# Patient Record
Sex: FEMALE | URBAN - METROPOLITAN AREA
[De-identification: names, ages, dates, MRNs, and addresses within clinical notes are randomized per-mention and may not be internally consistent; named-entity substitution may affect disease eponyms.]

---

## 2023-12-19 ENCOUNTER — LAB REQUISITION (OUTPATIENT)
Dept: LAB | Facility: CLINIC | Age: 63
End: 2023-12-19
Payer: COMMERCIAL

## 2023-12-19 DIAGNOSIS — M79.673 PAIN IN UNSPECIFIED FOOT: ICD-10-CM

## 2023-12-19 LAB
CRP SERPL-MCNC: 28.1 MG/L
ERYTHROCYTE [DISTWIDTH] IN BLOOD BY AUTOMATED COUNT: 13.3 % (ref 10–15)
ERYTHROCYTE [SEDIMENTATION RATE] IN BLOOD BY WESTERGREN METHOD: 18 MM/HR (ref 0–30)
HCT VFR BLD AUTO: 38.1 % (ref 35–47)
HGB BLD-MCNC: 12.9 G/DL (ref 11.7–15.7)
MCH RBC QN AUTO: 30.4 PG (ref 26.5–33)
MCHC RBC AUTO-ENTMCNC: 33.9 G/DL (ref 31.5–36.5)
MCV RBC AUTO: 90 FL (ref 78–100)
PLATELET # BLD AUTO: 289 10E3/UL (ref 150–450)
RBC # BLD AUTO: 4.25 10E6/UL (ref 3.8–5.2)
URATE SERPL-MCNC: 10.8 MG/DL (ref 2.4–5.7)
WBC # BLD AUTO: 10.3 10E3/UL (ref 4–11)

## 2023-12-19 PROCEDURE — 85027 COMPLETE CBC AUTOMATED: CPT | Mod: ORL | Performed by: PHYSICIAN ASSISTANT

## 2023-12-19 PROCEDURE — 36415 COLL VENOUS BLD VENIPUNCTURE: CPT | Mod: ORL | Performed by: PHYSICIAN ASSISTANT

## 2023-12-19 PROCEDURE — 86140 C-REACTIVE PROTEIN: CPT | Mod: ORL | Performed by: PHYSICIAN ASSISTANT

## 2023-12-19 PROCEDURE — 84550 ASSAY OF BLOOD/URIC ACID: CPT | Mod: ORL | Performed by: PHYSICIAN ASSISTANT

## 2023-12-19 PROCEDURE — 85652 RBC SED RATE AUTOMATED: CPT | Mod: ORL | Performed by: PHYSICIAN ASSISTANT

## 2025-05-14 ENCOUNTER — HOSPITAL ENCOUNTER (EMERGENCY)
Facility: CLINIC | Age: 65
Discharge: ANOTHER HOSPITAL | End: 2025-05-15
Attending: FAMILY MEDICINE | Admitting: FAMILY MEDICINE
Payer: COMMERCIAL

## 2025-05-14 DIAGNOSIS — R79.89 ELEVATED SERUM CREATININE: ICD-10-CM

## 2025-05-14 DIAGNOSIS — I44.2 THIRD DEGREE HEART BLOCK (H): ICD-10-CM

## 2025-05-14 DIAGNOSIS — I45.9 STOKES-ADAMS SYNCOPE: Primary | ICD-10-CM

## 2025-05-14 DIAGNOSIS — R79.89 ELEVATED TROPONIN: ICD-10-CM

## 2025-05-14 LAB
ANION GAP SERPL CALCULATED.3IONS-SCNC: 16 MMOL/L (ref 7–15)
ATRIAL RATE - MUSE: 34 BPM
BASE EXCESS BLDV CALC-SCNC: -6.1 MMOL/L (ref -3–3)
BASOPHILS # BLD AUTO: 0 10E3/UL (ref 0–0.2)
BASOPHILS NFR BLD AUTO: 0 %
BUN SERPL-MCNC: 43.2 MG/DL (ref 8–23)
CALCIUM SERPL-MCNC: 9.4 MG/DL (ref 8.8–10.4)
CHLORIDE SERPL-SCNC: 112 MMOL/L (ref 98–107)
CREAT SERPL-MCNC: 2.37 MG/DL (ref 0.51–0.95)
DIASTOLIC BLOOD PRESSURE - MUSE: 78 MMHG
EGFRCR SERPLBLD CKD-EPI 2021: 23 ML/MIN/1.73M2
EOSINOPHIL # BLD AUTO: 0.1 10E3/UL (ref 0–0.7)
EOSINOPHIL NFR BLD AUTO: 1 %
ERYTHROCYTE [DISTWIDTH] IN BLOOD BY AUTOMATED COUNT: 16 % (ref 10–15)
GLUCOSE SERPL-MCNC: 143 MG/DL (ref 70–99)
HCO3 BLDV-SCNC: 18 MMOL/L (ref 21–28)
HCO3 SERPL-SCNC: 13 MMOL/L (ref 22–29)
HCT VFR BLD AUTO: 35.6 % (ref 35–47)
HGB BLD-MCNC: 11.7 G/DL (ref 11.7–15.7)
HOLD SPECIMEN: NORMAL
IMM GRANULOCYTES # BLD: 0.1 10E3/UL
IMM GRANULOCYTES NFR BLD: 1 %
INTERPRETATION ECG - MUSE: NORMAL
LACTATE SERPL-SCNC: 1.8 MMOL/L (ref 0.7–2)
LYMPHOCYTES # BLD AUTO: 2.1 10E3/UL (ref 0.8–5.3)
LYMPHOCYTES NFR BLD AUTO: 16 %
MAGNESIUM SERPL-MCNC: 2.2 MG/DL (ref 1.7–2.3)
MCH RBC QN AUTO: 31.1 PG (ref 26.5–33)
MCHC RBC AUTO-ENTMCNC: 32.9 G/DL (ref 31.5–36.5)
MCV RBC AUTO: 95 FL (ref 78–100)
MONOCYTES # BLD AUTO: 1 10E3/UL (ref 0–1.3)
MONOCYTES NFR BLD AUTO: 8 %
NEUTROPHILS # BLD AUTO: 9.7 10E3/UL (ref 1.6–8.3)
NEUTROPHILS NFR BLD AUTO: 74 %
NRBC # BLD AUTO: 0 10E3/UL
NRBC BLD AUTO-RTO: 0 /100
NT-PROBNP SERPL-MCNC: 4993 PG/ML (ref 0–226)
O2/TOTAL GAS SETTING VFR VENT: 21 %
OXYHGB MFR BLDV: 92 % (ref 70–75)
P AXIS - MUSE: 27 DEGREES
PCO2 BLDV: 29 MM HG (ref 40–50)
PH BLDV: 7.39 [PH] (ref 7.32–7.43)
PLATELET # BLD AUTO: 271 10E3/UL (ref 150–450)
PO2 BLDV: 64 MM HG (ref 25–47)
POTASSIUM SERPL-SCNC: 5.2 MMOL/L (ref 3.4–5.3)
PR INTERVAL - MUSE: NORMAL MS
QRS DURATION - MUSE: 120 MS
QT - MUSE: 616 MS
QTC - MUSE: 449 MS
R AXIS - MUSE: -34 DEGREES
RBC # BLD AUTO: 3.76 10E6/UL (ref 3.8–5.2)
SAO2 % BLDV: 93.6 % (ref 70–75)
SODIUM SERPL-SCNC: 141 MMOL/L (ref 135–145)
SYSTOLIC BLOOD PRESSURE - MUSE: 188 MMHG
T AXIS - MUSE: 100 DEGREES
T4 FREE SERPL-MCNC: 0.97 NG/DL (ref 0.9–1.7)
TROPONIN T SERPL HS-MCNC: 130 NG/L
TROPONIN T SERPL HS-MCNC: 247 NG/L
TSH SERPL DL<=0.005 MIU/L-ACNC: 7.67 UIU/ML (ref 0.3–4.2)
VALPROATE SERPL-MCNC: 55.9 UG/ML (ref 50–100)
VENTRICULAR RATE- MUSE: 32 BPM
WBC # BLD AUTO: 13.1 10E3/UL (ref 4–11)

## 2025-05-14 PROCEDURE — 99285 EMERGENCY DEPT VISIT HI MDM: CPT | Mod: 25

## 2025-05-14 PROCEDURE — 85025 COMPLETE CBC W/AUTO DIFF WBC: CPT | Performed by: FAMILY MEDICINE

## 2025-05-14 PROCEDURE — 96374 THER/PROPH/DIAG INJ IV PUSH: CPT

## 2025-05-14 PROCEDURE — 83735 ASSAY OF MAGNESIUM: CPT | Performed by: FAMILY MEDICINE

## 2025-05-14 PROCEDURE — 93005 ELECTROCARDIOGRAM TRACING: CPT | Performed by: EMERGENCY MEDICINE

## 2025-05-14 PROCEDURE — 36415 COLL VENOUS BLD VENIPUNCTURE: CPT | Performed by: FAMILY MEDICINE

## 2025-05-14 PROCEDURE — 83880 ASSAY OF NATRIURETIC PEPTIDE: CPT | Performed by: FAMILY MEDICINE

## 2025-05-14 PROCEDURE — 84484 ASSAY OF TROPONIN QUANT: CPT | Performed by: FAMILY MEDICINE

## 2025-05-14 PROCEDURE — 84443 ASSAY THYROID STIM HORMONE: CPT | Performed by: FAMILY MEDICINE

## 2025-05-14 PROCEDURE — 250N000011 HC RX IP 250 OP 636: Performed by: FAMILY MEDICINE

## 2025-05-14 PROCEDURE — 82805 BLOOD GASES W/O2 SATURATION: CPT | Performed by: FAMILY MEDICINE

## 2025-05-14 PROCEDURE — 83605 ASSAY OF LACTIC ACID: CPT | Performed by: FAMILY MEDICINE

## 2025-05-14 PROCEDURE — 80164 ASSAY DIPROPYLACETIC ACD TOT: CPT | Performed by: FAMILY MEDICINE

## 2025-05-14 PROCEDURE — 82565 ASSAY OF CREATININE: CPT | Performed by: FAMILY MEDICINE

## 2025-05-14 PROCEDURE — 36415 COLL VENOUS BLD VENIPUNCTURE: CPT | Performed by: EMERGENCY MEDICINE

## 2025-05-14 PROCEDURE — 84439 ASSAY OF FREE THYROXINE: CPT | Performed by: FAMILY MEDICINE

## 2025-05-14 PROCEDURE — 258N000003 HC RX IP 258 OP 636: Performed by: FAMILY MEDICINE

## 2025-05-14 RX ORDER — LATANOPROST 50 UG/ML
1 SOLUTION/ DROPS OPHTHALMIC AT BEDTIME
Status: ON HOLD | COMMUNITY

## 2025-05-14 RX ORDER — DIVALPROEX SODIUM 500 MG/1
1000 TABLET, FILM COATED, EXTENDED RELEASE ORAL AT BEDTIME
Status: ON HOLD | COMMUNITY

## 2025-05-14 RX ORDER — LISINOPRIL 20 MG/1
20 TABLET ORAL DAILY
Status: ON HOLD | COMMUNITY

## 2025-05-14 RX ORDER — FAMOTIDINE 40 MG/1
40 TABLET, FILM COATED ORAL DAILY PRN
Status: ON HOLD | COMMUNITY

## 2025-05-14 RX ORDER — DIVALPROEX SODIUM 500 MG/1
1500 TABLET, FILM COATED, EXTENDED RELEASE ORAL AT BEDTIME
Status: ON HOLD | COMMUNITY

## 2025-05-14 RX ORDER — ALLOPURINOL 300 MG/1
1 TABLET ORAL
Status: ON HOLD | COMMUNITY

## 2025-05-14 RX ORDER — DIPHENHYDRAMINE HCL 25 MG
50 TABLET ORAL AT BEDTIME
Status: ON HOLD | COMMUNITY

## 2025-05-14 RX ORDER — ATROPINE SULFATE 0.1 MG/ML
1 INJECTION INTRAVENOUS ONCE
Status: COMPLETED | OUTPATIENT
Start: 2025-05-14 | End: 2025-05-14

## 2025-05-14 RX ORDER — OLANZAPINE 20 MG/1
20 TABLET, FILM COATED ORAL AT BEDTIME
Status: ON HOLD | COMMUNITY

## 2025-05-14 RX ORDER — QUETIAPINE FUMARATE 100 MG/1
100-200 TABLET, FILM COATED ORAL AT BEDTIME
Status: ON HOLD | COMMUNITY

## 2025-05-14 RX ADMIN — SODIUM CHLORIDE 500 ML: 0.9 INJECTION, SOLUTION INTRAVENOUS at 21:19

## 2025-05-14 RX ADMIN — ATROPINE SULFATE 1 MG: 0.1 INJECTION INTRAVENOUS at 22:57

## 2025-05-14 ASSESSMENT — COLUMBIA-SUICIDE SEVERITY RATING SCALE - C-SSRS
2. HAVE YOU ACTUALLY HAD ANY THOUGHTS OF KILLING YOURSELF IN THE PAST MONTH?: NO
6. HAVE YOU EVER DONE ANYTHING, STARTED TO DO ANYTHING, OR PREPARED TO DO ANYTHING TO END YOUR LIFE?: NO
1. IN THE PAST MONTH, HAVE YOU WISHED YOU WERE DEAD OR WISHED YOU COULD GO TO SLEEP AND NOT WAKE UP?: NO

## 2025-05-14 ASSESSMENT — ACTIVITIES OF DAILY LIVING (ADL)
ADLS_ACUITY_SCORE: 42

## 2025-05-15 ENCOUNTER — APPOINTMENT (OUTPATIENT)
Dept: CARDIOLOGY | Facility: CLINIC | Age: 65
End: 2025-05-15
Attending: HOSPITALIST
Payer: COMMERCIAL

## 2025-05-15 ENCOUNTER — HOSPITAL ENCOUNTER (INPATIENT)
Facility: CLINIC | Age: 65
End: 2025-05-15
Attending: HOSPITALIST | Admitting: HOSPITALIST
Payer: COMMERCIAL

## 2025-05-15 VITALS
TEMPERATURE: 99.2 F | BODY MASS INDEX: 38.2 KG/M2 | OXYGEN SATURATION: 94 % | HEIGHT: 65 IN | WEIGHT: 229.28 LBS | DIASTOLIC BLOOD PRESSURE: 79 MMHG | RESPIRATION RATE: 20 BRPM | HEART RATE: 90 BPM | SYSTOLIC BLOOD PRESSURE: 153 MMHG

## 2025-05-15 VITALS
HEART RATE: 30 BPM | TEMPERATURE: 98.2 F | OXYGEN SATURATION: 95 % | SYSTOLIC BLOOD PRESSURE: 169 MMHG | RESPIRATION RATE: 30 BRPM | DIASTOLIC BLOOD PRESSURE: 72 MMHG | WEIGHT: 220 LBS | HEIGHT: 65 IN | BODY MASS INDEX: 36.65 KG/M2

## 2025-05-15 DIAGNOSIS — I45.9 STOKES-ADAMS SYNCOPE: ICD-10-CM

## 2025-05-15 DIAGNOSIS — R52 PAIN: ICD-10-CM

## 2025-05-15 DIAGNOSIS — R09.81 CONGESTION OF PARANASAL SINUS: ICD-10-CM

## 2025-05-15 DIAGNOSIS — I10 BENIGN ESSENTIAL HYPERTENSION: ICD-10-CM

## 2025-05-15 DIAGNOSIS — I44.2 COMPLETE HEART BLOCK (H): Primary | ICD-10-CM

## 2025-05-15 DIAGNOSIS — I44.2 THIRD DEGREE HEART BLOCK (H): ICD-10-CM

## 2025-05-15 LAB
ALBUMIN UR-MCNC: NEGATIVE MG/DL
ANION GAP SERPL CALCULATED.3IONS-SCNC: 10 MMOL/L (ref 7–15)
APPEARANCE UR: CLEAR
ATRIAL RATE - MUSE: 93 BPM
BASOPHILS # BLD AUTO: 0 10E3/UL (ref 0–0.2)
BASOPHILS NFR BLD AUTO: 0 %
BILIRUB UR QL STRIP: NEGATIVE
BUN SERPL-MCNC: 37.7 MG/DL (ref 8–23)
CALCIUM SERPL-MCNC: 9.4 MG/DL (ref 8.8–10.4)
CHLORIDE SERPL-SCNC: 113 MMOL/L (ref 98–107)
COLOR UR AUTO: NORMAL
CREAT SERPL-MCNC: 2.13 MG/DL (ref 0.51–0.95)
DIASTOLIC BLOOD PRESSURE - MUSE: NORMAL MMHG
EGFRCR SERPLBLD CKD-EPI 2021: 26 ML/MIN/1.73M2
EOSINOPHIL # BLD AUTO: 0.1 10E3/UL (ref 0–0.7)
EOSINOPHIL NFR BLD AUTO: 1 %
ERYTHROCYTE [DISTWIDTH] IN BLOOD BY AUTOMATED COUNT: 16 % (ref 10–15)
ERYTHROCYTE [DISTWIDTH] IN BLOOD BY AUTOMATED COUNT: 16.2 % (ref 10–15)
GLUCOSE BLDC GLUCOMTR-MCNC: 121 MG/DL (ref 70–99)
GLUCOSE SERPL-MCNC: 116 MG/DL (ref 70–99)
GLUCOSE UR STRIP-MCNC: NEGATIVE MG/DL
HCO3 SERPL-SCNC: 20 MMOL/L (ref 22–29)
HCT VFR BLD AUTO: 33.6 % (ref 35–47)
HCT VFR BLD AUTO: 34.5 % (ref 35–47)
HGB BLD-MCNC: 11.1 G/DL (ref 11.7–15.7)
HGB BLD-MCNC: 11.3 G/DL (ref 11.7–15.7)
HGB UR QL STRIP: NEGATIVE
IMM GRANULOCYTES # BLD: 0.1 10E3/UL
IMM GRANULOCYTES NFR BLD: 0 %
INR PPP: 1.19 (ref 0.85–1.15)
INTERPRETATION ECG - MUSE: NORMAL
KETONES UR STRIP-MCNC: NEGATIVE MG/DL
LEUKOCYTE ESTERASE UR QL STRIP: NEGATIVE
LVEF ECHO: NORMAL
LYMPHOCYTES # BLD AUTO: 4.7 10E3/UL (ref 0.8–5.3)
LYMPHOCYTES NFR BLD AUTO: 42 %
MCH RBC QN AUTO: 31.2 PG (ref 26.5–33)
MCH RBC QN AUTO: 31.6 PG (ref 26.5–33)
MCHC RBC AUTO-ENTMCNC: 32.8 G/DL (ref 31.5–36.5)
MCHC RBC AUTO-ENTMCNC: 33 G/DL (ref 31.5–36.5)
MCV RBC AUTO: 95 FL (ref 78–100)
MCV RBC AUTO: 96 FL (ref 78–100)
MONOCYTES # BLD AUTO: 0.7 10E3/UL (ref 0–1.3)
MONOCYTES NFR BLD AUTO: 7 %
NEUTROPHILS # BLD AUTO: 5.6 10E3/UL (ref 1.6–8.3)
NEUTROPHILS NFR BLD AUTO: 50 %
NITRATE UR QL: NEGATIVE
NRBC # BLD AUTO: 0 10E3/UL
NRBC BLD AUTO-RTO: 0 /100
P AXIS - MUSE: 46 DEGREES
PH UR STRIP: 6 [PH] (ref 5–7)
PLATELET # BLD AUTO: 220 10E3/UL (ref 150–450)
PLATELET # BLD AUTO: 224 10E3/UL (ref 150–450)
POTASSIUM SERPL-SCNC: 5.4 MMOL/L (ref 3.4–5.3)
PR INTERVAL - MUSE: 198 MS
PROTHROMBIN TIME: 14.8 SECONDS (ref 11.8–14.8)
QRS DURATION - MUSE: 138 MS
QT - MUSE: 426 MS
QTC - MUSE: 529 MS
R AXIS - MUSE: 110 DEGREES
RBC # BLD AUTO: 3.51 10E6/UL (ref 3.8–5.2)
RBC # BLD AUTO: 3.62 10E6/UL (ref 3.8–5.2)
SODIUM SERPL-SCNC: 143 MMOL/L (ref 135–145)
SP GR UR STRIP: 1.01 (ref 1–1.03)
SYSTOLIC BLOOD PRESSURE - MUSE: NORMAL MMHG
T AXIS - MUSE: -55 DEGREES
TROPONIN T SERPL HS-MCNC: 360 NG/L
TROPONIN T SERPL HS-MCNC: 373 NG/L
UROBILINOGEN UR STRIP-MCNC: NORMAL MG/DL
VENTRICULAR RATE- MUSE: 93 BPM
WBC # BLD AUTO: 10.8 10E3/UL (ref 4–11)
WBC # BLD AUTO: 11.2 10E3/UL (ref 4–11)

## 2025-05-15 PROCEDURE — 99291 CRITICAL CARE FIRST HOUR: CPT | Mod: 25 | Performed by: INTERNAL MEDICINE

## 2025-05-15 PROCEDURE — C1785 PMKR, DUAL, RATE-RESP: HCPCS | Performed by: INTERNAL MEDICINE

## 2025-05-15 PROCEDURE — 999N000208 ECHOCARDIOGRAM COMPLETE

## 2025-05-15 PROCEDURE — 258N000002 HC RX IP 258 OP 250: Performed by: PHYSICIAN ASSISTANT

## 2025-05-15 PROCEDURE — 250N000013 HC RX MED GY IP 250 OP 250 PS 637: Performed by: HOSPITALIST

## 2025-05-15 PROCEDURE — 5A1223Z PERFORMANCE OF CARDIAC PACING, CONTINUOUS: ICD-10-PCS | Performed by: INTERNAL MEDICINE

## 2025-05-15 PROCEDURE — 93005 ELECTROCARDIOGRAM TRACING: CPT

## 2025-05-15 PROCEDURE — 99223 1ST HOSP IP/OBS HIGH 75: CPT | Mod: FS | Performed by: PHYSICIAN ASSISTANT

## 2025-05-15 PROCEDURE — 250N000011 HC RX IP 250 OP 636: Performed by: INTERNAL MEDICINE

## 2025-05-15 PROCEDURE — 255N000002 HC RX 255 OP 636: Performed by: HOSPITALIST

## 2025-05-15 PROCEDURE — 99152 MOD SED SAME PHYS/QHP 5/>YRS: CPT | Mod: GC | Performed by: INTERNAL MEDICINE

## 2025-05-15 PROCEDURE — 99223 1ST HOSP IP/OBS HIGH 75: CPT | Mod: AI | Performed by: HOSPITALIST

## 2025-05-15 PROCEDURE — 85025 COMPLETE CBC W/AUTO DIFF WBC: CPT | Performed by: HOSPITALIST

## 2025-05-15 PROCEDURE — C1894 INTRO/SHEATH, NON-LASER: HCPCS | Performed by: INTERNAL MEDICINE

## 2025-05-15 PROCEDURE — C1730 CATH, EP, 19 OR FEW ELECT: HCPCS | Performed by: INTERNAL MEDICINE

## 2025-05-15 PROCEDURE — 93306 TTE W/DOPPLER COMPLETE: CPT | Mod: 26 | Performed by: INTERNAL MEDICINE

## 2025-05-15 PROCEDURE — 81003 URINALYSIS AUTO W/O SCOPE: CPT | Performed by: HOSPITALIST

## 2025-05-15 PROCEDURE — 36415 COLL VENOUS BLD VENIPUNCTURE: CPT | Performed by: PHYSICIAN ASSISTANT

## 2025-05-15 PROCEDURE — C1898 LEAD, PMKR, OTHER THAN TRANS: HCPCS | Performed by: INTERNAL MEDICINE

## 2025-05-15 PROCEDURE — 85018 HEMOGLOBIN: CPT | Performed by: PHYSICIAN ASSISTANT

## 2025-05-15 PROCEDURE — 210N000001 HC R&B IMCU HEART CARE

## 2025-05-15 PROCEDURE — 84484 ASSAY OF TROPONIN QUANT: CPT | Performed by: HOSPITALIST

## 2025-05-15 PROCEDURE — 36415 COLL VENOUS BLD VENIPUNCTURE: CPT | Performed by: HOSPITALIST

## 2025-05-15 PROCEDURE — C1887 CATHETER, GUIDING: HCPCS | Performed by: INTERNAL MEDICINE

## 2025-05-15 PROCEDURE — 99152 MOD SED SAME PHYS/QHP 5/>YRS: CPT | Performed by: INTERNAL MEDICINE

## 2025-05-15 PROCEDURE — 272N000001 HC OR GENERAL SUPPLY STERILE: Performed by: INTERNAL MEDICINE

## 2025-05-15 PROCEDURE — 99153 MOD SED SAME PHYS/QHP EA: CPT | Performed by: INTERNAL MEDICINE

## 2025-05-15 PROCEDURE — 99207 PR NO BILLABLE SERVICE THIS VISIT: CPT | Performed by: HOSPITALIST

## 2025-05-15 PROCEDURE — 85610 PROTHROMBIN TIME: CPT | Performed by: PHYSICIAN ASSISTANT

## 2025-05-15 PROCEDURE — 82310 ASSAY OF CALCIUM: CPT | Performed by: HOSPITALIST

## 2025-05-15 PROCEDURE — 250N000009 HC RX 250: Performed by: INTERNAL MEDICINE

## 2025-05-15 PROCEDURE — 33208 INSRT HEART PM ATRIAL & VENT: CPT | Performed by: INTERNAL MEDICINE

## 2025-05-15 PROCEDURE — 33208 INSRT HEART PM ATRIAL & VENT: CPT | Mod: KX | Performed by: INTERNAL MEDICINE

## 2025-05-15 PROCEDURE — 33210 INSERT ELECTRD/PM CATH SNGL: CPT | Mod: GC | Performed by: INTERNAL MEDICINE

## 2025-05-15 PROCEDURE — 02HK3JZ INSERTION OF PACEMAKER LEAD INTO RIGHT VENTRICLE, PERCUTANEOUS APPROACH: ICD-10-PCS | Performed by: INTERNAL MEDICINE

## 2025-05-15 PROCEDURE — 33210 INSERT ELECTRD/PM CATH SNGL: CPT | Performed by: INTERNAL MEDICINE

## 2025-05-15 PROCEDURE — 99418 PROLNG IP/OBS E/M EA 15 MIN: CPT | Performed by: HOSPITALIST

## 2025-05-15 DEVICE — LEAD PACEMAKER SELECTSECURE 69CM MD  383069: Type: IMPLANTABLE DEVICE | Status: FUNCTIONAL

## 2025-05-15 DEVICE — PACEMAKER AZURE MRI XT DR: Type: IMPLANTABLE DEVICE | Status: FUNCTIONAL

## 2025-05-15 DEVICE — IMP LEAD PACING BIPOLAR CAPSUREFIX NOVUS 52CM 5076-52: Type: IMPLANTABLE DEVICE | Status: FUNCTIONAL

## 2025-05-15 RX ORDER — BUPIVACAINE HYDROCHLORIDE 2.5 MG/ML
INJECTION, SOLUTION EPIDURAL; INFILTRATION; INTRACAUDAL; PERINEURAL
Status: DISCONTINUED | OUTPATIENT
Start: 2025-05-15 | End: 2025-05-15 | Stop reason: HOSPADM

## 2025-05-15 RX ORDER — DIVALPROEX SODIUM 500 MG/1
1000 TABLET, FILM COATED, EXTENDED RELEASE ORAL EVERY OTHER DAY
Status: DISCONTINUED | OUTPATIENT
Start: 2025-05-16 | End: 2025-05-15

## 2025-05-15 RX ORDER — CEFAZOLIN SODIUM 2 G/100ML
INJECTION, SOLUTION INTRAVENOUS CONTINUOUS PRN
Status: DISCONTINUED | OUTPATIENT
Start: 2025-05-15 | End: 2025-05-15 | Stop reason: HOSPADM

## 2025-05-15 RX ORDER — LATANOPROST 50 UG/ML
1 SOLUTION/ DROPS OPHTHALMIC AT BEDTIME
Status: DISCONTINUED | OUTPATIENT
Start: 2025-05-15 | End: 2025-05-15

## 2025-05-15 RX ORDER — LIDOCAINE 40 MG/G
CREAM TOPICAL
Status: CANCELLED | OUTPATIENT
Start: 2025-05-15

## 2025-05-15 RX ORDER — SODIUM CHLORIDE 450 MG/100ML
INJECTION, SOLUTION INTRAVENOUS CONTINUOUS
Status: CANCELLED | OUTPATIENT
Start: 2025-05-15

## 2025-05-15 RX ORDER — LATANOPROST 50 UG/ML
1 SOLUTION/ DROPS OPHTHALMIC AT BEDTIME
Status: DISCONTINUED | OUTPATIENT
Start: 2025-05-15 | End: 2025-05-19 | Stop reason: HOSPADM

## 2025-05-15 RX ORDER — AMOXICILLIN 250 MG
1 CAPSULE ORAL 2 TIMES DAILY PRN
Status: DISCONTINUED | OUTPATIENT
Start: 2025-05-15 | End: 2025-05-19 | Stop reason: HOSPADM

## 2025-05-15 RX ORDER — ACETAMINOPHEN 650 MG/1
650 SUPPOSITORY RECTAL EVERY 4 HOURS PRN
Status: DISCONTINUED | OUTPATIENT
Start: 2025-05-15 | End: 2025-05-19 | Stop reason: HOSPADM

## 2025-05-15 RX ORDER — GUAIFENESIN/DEXTROMETHORPHAN 100-10MG/5
10 SYRUP ORAL EVERY 4 HOURS PRN
Status: DISCONTINUED | OUTPATIENT
Start: 2025-05-15 | End: 2025-05-19 | Stop reason: HOSPADM

## 2025-05-15 RX ORDER — LISINOPRIL 20 MG/1
20 TABLET ORAL DAILY
Status: DISCONTINUED | OUTPATIENT
Start: 2025-05-15 | End: 2025-05-16

## 2025-05-15 RX ORDER — DIVALPROEX SODIUM 500 MG/1
1000 TABLET, FILM COATED, EXTENDED RELEASE ORAL EVERY OTHER DAY
Status: DISCONTINUED | OUTPATIENT
Start: 2025-05-16 | End: 2025-05-19 | Stop reason: HOSPADM

## 2025-05-15 RX ORDER — NALOXONE HYDROCHLORIDE 0.4 MG/ML
0.2 INJECTION, SOLUTION INTRAMUSCULAR; INTRAVENOUS; SUBCUTANEOUS
Status: DISCONTINUED | OUTPATIENT
Start: 2025-05-15 | End: 2025-05-19 | Stop reason: HOSPADM

## 2025-05-15 RX ORDER — NALOXONE HYDROCHLORIDE 0.4 MG/ML
0.4 INJECTION, SOLUTION INTRAMUSCULAR; INTRAVENOUS; SUBCUTANEOUS
Status: DISCONTINUED | OUTPATIENT
Start: 2025-05-15 | End: 2025-05-19 | Stop reason: HOSPADM

## 2025-05-15 RX ORDER — QUETIAPINE FUMARATE 25 MG/1
100-200 TABLET, FILM COATED ORAL AT BEDTIME
Status: DISCONTINUED | OUTPATIENT
Start: 2025-05-15 | End: 2025-05-19 | Stop reason: HOSPADM

## 2025-05-15 RX ORDER — FAMOTIDINE 20 MG/1
40 TABLET, FILM COATED ORAL DAILY PRN
Status: DISCONTINUED | OUTPATIENT
Start: 2025-05-15 | End: 2025-05-15

## 2025-05-15 RX ORDER — DIPHENHYDRAMINE HCL 25 MG
50 CAPSULE ORAL AT BEDTIME
Status: DISCONTINUED | OUTPATIENT
Start: 2025-05-15 | End: 2025-05-19 | Stop reason: HOSPADM

## 2025-05-15 RX ORDER — FENTANYL CITRATE 50 UG/ML
INJECTION, SOLUTION INTRAMUSCULAR; INTRAVENOUS
Status: DISCONTINUED | OUTPATIENT
Start: 2025-05-15 | End: 2025-05-15 | Stop reason: HOSPADM

## 2025-05-15 RX ORDER — SODIUM CHLORIDE 450 MG/100ML
INJECTION, SOLUTION INTRAVENOUS CONTINUOUS
Status: DISCONTINUED | OUTPATIENT
Start: 2025-05-15 | End: 2025-05-15 | Stop reason: HOSPADM

## 2025-05-15 RX ORDER — LIDOCAINE 40 MG/G
CREAM TOPICAL
Status: DISCONTINUED | OUTPATIENT
Start: 2025-05-15 | End: 2025-05-19 | Stop reason: HOSPADM

## 2025-05-15 RX ORDER — ALLOPURINOL 300 MG/1
300 TABLET ORAL
Status: DISCONTINUED | OUTPATIENT
Start: 2025-05-15 | End: 2025-05-19 | Stop reason: HOSPADM

## 2025-05-15 RX ORDER — AMOXICILLIN 250 MG
2 CAPSULE ORAL 2 TIMES DAILY PRN
Status: DISCONTINUED | OUTPATIENT
Start: 2025-05-15 | End: 2025-05-19 | Stop reason: HOSPADM

## 2025-05-15 RX ORDER — CEFAZOLIN SODIUM 2 G/50ML
2 SOLUTION INTRAVENOUS
Status: DISCONTINUED | OUTPATIENT
Start: 2025-05-15 | End: 2025-05-15 | Stop reason: HOSPADM

## 2025-05-15 RX ORDER — FAMOTIDINE 20 MG/1
20 TABLET, FILM COATED ORAL DAILY PRN
Status: DISCONTINUED | OUTPATIENT
Start: 2025-05-15 | End: 2025-05-19 | Stop reason: HOSPADM

## 2025-05-15 RX ORDER — OLANZAPINE 5 MG/1
20 TABLET, FILM COATED ORAL AT BEDTIME
Status: DISCONTINUED | OUTPATIENT
Start: 2025-05-15 | End: 2025-05-19 | Stop reason: HOSPADM

## 2025-05-15 RX ORDER — LIDOCAINE 40 MG/G
CREAM TOPICAL
Status: DISCONTINUED | OUTPATIENT
Start: 2025-05-15 | End: 2025-05-19

## 2025-05-15 RX ORDER — DIVALPROEX SODIUM 500 MG/1
1500 TABLET, FILM COATED, EXTENDED RELEASE ORAL EVERY OTHER DAY
Status: DISCONTINUED | OUTPATIENT
Start: 2025-05-15 | End: 2025-05-19 | Stop reason: HOSPADM

## 2025-05-15 RX ORDER — ONDANSETRON 4 MG/1
4 TABLET, ORALLY DISINTEGRATING ORAL EVERY 6 HOURS PRN
Status: DISCONTINUED | OUTPATIENT
Start: 2025-05-15 | End: 2025-05-19 | Stop reason: HOSPADM

## 2025-05-15 RX ORDER — OXYCODONE AND ACETAMINOPHEN 5; 325 MG/1; MG/1
1 TABLET ORAL EVERY 4 HOURS PRN
Status: DISCONTINUED | OUTPATIENT
Start: 2025-05-15 | End: 2025-05-19 | Stop reason: HOSPADM

## 2025-05-15 RX ORDER — ONDANSETRON 2 MG/ML
4 INJECTION INTRAMUSCULAR; INTRAVENOUS EVERY 6 HOURS PRN
Status: DISCONTINUED | OUTPATIENT
Start: 2025-05-15 | End: 2025-05-19 | Stop reason: HOSPADM

## 2025-05-15 RX ORDER — LIDOCAINE 40 MG/G
CREAM TOPICAL
Status: DISCONTINUED | OUTPATIENT
Start: 2025-05-15 | End: 2025-05-15 | Stop reason: HOSPADM

## 2025-05-15 RX ORDER — CALCIUM CARBONATE 500 MG/1
1000 TABLET, CHEWABLE ORAL 4 TIMES DAILY PRN
Status: DISCONTINUED | OUTPATIENT
Start: 2025-05-15 | End: 2025-05-19 | Stop reason: HOSPADM

## 2025-05-15 RX ORDER — HYDRALAZINE HYDROCHLORIDE 20 MG/ML
10 INJECTION INTRAMUSCULAR; INTRAVENOUS EVERY 4 HOURS PRN
Status: DISCONTINUED | OUTPATIENT
Start: 2025-05-15 | End: 2025-05-19 | Stop reason: HOSPADM

## 2025-05-15 RX ORDER — ACETAMINOPHEN 325 MG/1
650 TABLET ORAL EVERY 4 HOURS PRN
Status: DISCONTINUED | OUTPATIENT
Start: 2025-05-15 | End: 2025-05-19 | Stop reason: HOSPADM

## 2025-05-15 RX ADMIN — QUETIAPINE FUMARATE 200 MG: 25 TABLET ORAL at 02:43

## 2025-05-15 RX ADMIN — ALLOPURINOL 300 MG: 300 TABLET ORAL at 17:57

## 2025-05-15 RX ADMIN — QUETIAPINE FUMARATE 100 MG: 25 TABLET ORAL at 21:19

## 2025-05-15 RX ADMIN — SODIUM CHLORIDE: 4.5 INJECTION, SOLUTION INTRAVENOUS at 15:44

## 2025-05-15 RX ADMIN — DIVALPROEX SODIUM 1500 MG: 500 TABLET, FILM COATED, EXTENDED RELEASE ORAL at 21:19

## 2025-05-15 RX ADMIN — LATANOPROST 1 DROP: 50 SOLUTION OPHTHALMIC at 21:12

## 2025-05-15 RX ADMIN — PERFLUTREN 10 ML: 6.52 INJECTION, SUSPENSION INTRAVENOUS at 15:09

## 2025-05-15 RX ADMIN — DIPHENHYDRAMINE HYDROCHLORIDE 50 MG: 25 CAPSULE ORAL at 21:19

## 2025-05-15 RX ADMIN — GUAIFENESIN AND DEXTROMETHORPHAN 10 ML: 100; 10 SYRUP ORAL at 02:43

## 2025-05-15 RX ADMIN — OLANZAPINE 20 MG: 5 TABLET, FILM COATED ORAL at 21:19

## 2025-05-15 RX ADMIN — GUAIFENESIN AND DEXTROMETHORPHAN 10 ML: 100; 10 SYRUP ORAL at 22:12

## 2025-05-15 RX ADMIN — GUAIFENESIN AND DEXTROMETHORPHAN 10 ML: 100; 10 SYRUP ORAL at 13:55

## 2025-05-15 RX ADMIN — GUAIFENESIN AND DEXTROMETHORPHAN 10 ML: 100; 10 SYRUP ORAL at 08:29

## 2025-05-15 ASSESSMENT — ACTIVITIES OF DAILY LIVING (ADL)
ADLS_ACUITY_SCORE: 41

## 2025-05-15 NOTE — Clinical Note
Temporary pacemaker Rate= 60bpmPaced mA= 5Pacer wire was captured appropriately, Pacer is set and Pacing catheter was left in place

## 2025-05-15 NOTE — Clinical Note
The ECG shows a paced rhythm. The patient has temporary pacemaker. Pacemaker at 10 mA. ECG rate  = 60 bpm.

## 2025-05-15 NOTE — Clinical Note
Hemodynamic equipment used: 5 lead ECG, Responsive SportsK With 3 Leads, Machine BP Cuff and pulse oximeter probe.

## 2025-05-15 NOTE — PROGRESS NOTES
Attempted to meet with patient but she was with another care team member. Will attempt tomorrow.    SENG Choudhury, [facility]  Social Work- Inpatient Care Coordination  [facility]

## 2025-05-15 NOTE — PLAN OF CARE
Summary:    Patient Name: Evelyn Roa     Room#: 0274/0274-01     Admit Date: 5/14/2025  Primary Diagnosis: Complete heart block (H)  (primary encounter diagnosis)  Third degree heart block (H)  Graf-Rios syncope    Procedures This Admit:  Temporary Pacemaker at 12, PPM at 16:30   Drips:  N/a  Drains & Devices:  LPIV SL. Purewick in place   Rhythm:  Atrial-sensed ventricular-paced   Activity Level:  Bedrest until 20:45  Oxygen needs: RA  Important Labs Since Admit: Trop 373  Significant Echo results:  Possilbe Complete AV block  Anticipated D/C Date: Pending   Other: PRN Robitussin given x2 for cough

## 2025-05-15 NOTE — PROGRESS NOTES
[facility]    Medicine Progress Note - Hospitalist Service    Date of Admission:  5/15/2025    Assessment & Plan   Evelyn Roa is a markedly pleasant 64 year old woman with past medical history that is most notable for hypertension, gout, and Bipolar 1 Disorder, among others; who presents with cough, exertional dyspnea, and syncope, and is found to have high grade AV block and elevated Troponin, as well as MURRAY.          High grade AV block and elevated Troponin: Of note, Ms. Roa has never smoked. She has hypertension. She denies previous history of CAD, CHF, stroke, or diabetes.   *5/15/25 she presents for evaluation of cough, exertional dyspnea, and syncope. In the ED, she has accelerated hypertension, and sever bradycardia as low as 25. She is afebrile, without hypoxia at rest. She has acute leukocytosis to 13.1. Lactate is normal. She has MURRAY and acute metabolic acidosis, as discussed below. TSH is 7.67, FT4 normal at 0.97. Pro BNP is elevated at 4993. Troponin T levels are 130 and then 247. EKG shows sinus bradycardia with rate 32, A-V dissociation, and idioventricular escape beats. Valproic acid level is normal at 55.9  - IMC, npo  - tele  - trops - up to 373 thus far (denies chest pain or pressure)  - Cardiology/EP following - ? Temporary      Hyperkalemia  MURRYA and acute AG metabolic acidosis  *adm CO2 is 13, Cr 2.37. Cr was 1.4 in 6/2024. Suspect due to modest hypovolemia, at least in part; vs cardiorenal syndrome. IVF was given in the ED.   - Repeat BMP 05/15/25 showing resolution of AG, HCO now 20  - creat 2.1 <--- 2.3  - hold ACE-I   - follow BMP     Accelerated hypertension  - Home Lisinopril held for now for MURRAY. PRN IV Hydralazine ordered     Bipolar 1 Disorder: In remission  - On Depakote, Zyprexa, and Seroquel - continued     Gout  - Resume home Allopurinol     GERD  - Resume Pepcid     Glaucoma  - Resume home eye drops           Diet: NPO for Procedure/Surgery per  "Anesthesia Guidelines Except for: Meds; Clear liquids before procedure/surgery: ADULT (Age GREATER than or Equal to 18 years) - Clear liquids 2 hours before procedure/surgery    DVT Prophylaxis: Pneumatic Compression Devices  Finley Catheter: Not present  Lines: PRESENT             Cardiac Monitoring: ACTIVE order. Indication: Bradycardias (48 hours)  Code Status: Full Code      Clinically Significant Risk Factors Present on Admission        # Hyperkalemia: Highest K = 5.4 mmol/L in last 2 days, will monitor as appropriate   # Hyperchloremia: Highest Cl = 113 mmol/L in last 2 days, will monitor as appropriate              # Hypertension: Home medication list includes antihypertensive(s)           # Obesity: Estimated body mass index is 38.75 kg/m  as calculated from the following:    Height as of this encounter: 1.638 m (5' 4.5\").    Weight as of this encounter: 104 kg (229 lb 4.5 oz).              Social Drivers of Health    Tobacco Use: Unknown (5/15/2025)    Patient History     Smoking Tobacco Use: Never     Smokeless Tobacco Use: Unknown   Financial Resource Strain: Medium Risk (1/9/2024)    [facility]    Financial Resource Strain     Difficulty of Paying Living Expenses: 2     Difficulty of Paying Living Expenses: 1          Disposition Plan     Medically Ready for Discharge: Anticipated in 2-4 Days             Shaggy Andrade MD  Hospitalist Service  [facility]  Securely message with [employer] (more info)  Text page via [address] Paging/Directory   ______________________________________________________________________    Interval History   Care assumed today. Seen and examined. No new complaints - denies chest pain or pressure. Reports 3-4 weeks of cough/congestion at times - no fevers or chills. Cardiology/EP seeing today.      Physical Exam   Vital Signs: Temp: 97.9  F (36.6  C) Temp src: Oral BP: (!) 153/56 Pulse: 60   Resp: 25 SpO2: 95 % " O2 Device: Nasal cannula Oxygen Delivery: 2 LPM  Weight: 229 lbs 4.45 oz    Gen: NAD, pleasant  HEENT: EOMI, MMM  Resp: no focal crackles,  no wheezes, no increased work of resp  CV: S1S2 heard,  darline in low 30s  Abdo: soft, nontender, nondistended, bowel sounds present  Ext: calves nontender, well perfused  Neuro: aa, conversant, moving ext, CN grossly intact, no facial asymmetry      Medical Decision Making       39 MINUTES SPENT BY ME on the date of service doing chart review, history, exam, documentation & further activities per the note.      Data     I have personally reviewed the following data over the past 24 hrs:    11.2 (H)  \   11.1 (L)   / 224     143 113 (H) 37.7 (H) /  116 (H)   5.4 (H) 20 (L) 2.13 (H) \     Trop: 373 (HH) BNP: 4,993 (H)     TSH: 7.67 (H) T4: 0.97 A1C: N/A     Procal: N/A CRP: N/A Lactic Acid: 1.8

## 2025-05-15 NOTE — PHARMACY-ADMISSION MEDICATION HISTORY
Admission medication history completed at [facility]. Please see Medication Scribe Admission Medication History note from 05/14/2025.    KYLEIGH KINCAID RPH on 5/15/2025 at 1:41 AM

## 2025-05-15 NOTE — CONSULTS
21-Jan-2020 HOSPITAL CARDIOLOGY CONSULTATION NOTE      Evelyn Roa MRN# [id number]   YOB: 1960 Age: 64 year old   Date of Admission: 5/15/2025     Reason for consult:            Assessment and Plan:     This patient is a 64 year old female with pertinent past medical history pertinent for hypertension, bipolar 1 disorder, gout, impaired glucose tolerance, gastroesophageal reflux disease, subclinical hypothyroidism , chronic kidney disease (baseline creatinine 1.1 (and several other noncardiac comorbidities, who was admitted 5/15/2025 for exertional dizziness/dyspnea for the past 10 days; she also suffered 2 syncopal episodes recently, the first 10 days ago and the second prior to admission.  During the first episode she was standing up changing clothes.  During the second episode she was sitting up in a chair.  Upon arrival to the ED she was noted to have complete heart block with escape rhythm (QRS duration 120 ms) and heart rate 30 bpm.  Blood pressure was elevated.  She was given atropine and transferred from [facility] to our facility.  She also describes a cough over the past 10 days.    This morning she has had 2 rounds of nonsustained polymorphic VT; less than 10 seconds.  The second episode occurred while I was in the room talking with her; she felt mildly lightheaded during it.    Symptomatic complete heart block with syncope, intermittent nonsustained polymorphic VT:   A.  She has symptomatic complete heart block; initially developed exertional lightheadedness/dyspnea 10 days ago which is probably when the complete heart block started;  now has had 2 syncopal episodes over the past 10 days.  B.  She has a low junctional/high ventricular escape rhythm of 30 bpm (QRS duration 120 ms).  Will proceed with EP consultation this morning.  She will likely require pacemaker or possibly ICD (see below).    C.  Additionally evidence of nonsustained polymorphic VT/torsades. This may be bradycardia related.  TThe  cardiac troponin is mildly elevated which I would attribute to supply/demand mismatch in the setting of prolonged heart block over the past 10 days; the other possibility would be myocarditis.  CAD is less likely but cannot be excluded.    D.  Obtain stat echocardiogram prior to pacemaker implant.    Mild cardiac troponin elevation, query myocarditis versus supply/demand mismatch:   A.  I suspect this is related to supply/demand mismatch in the setting of complete heart block and hypoperfusion.  The other possibility of course is myocarditis (she has had a cough for the past 10 days, complete heart block, mildly elevated WBC count).   B. We will obtain an echocardiogram to assess LVEF and regional wall motion.  Further recommendations to follow.    Hypertension:   A.  At home she is taking lisinopril 20 mg daily.  Hold this in light of MURRAY.  The MURRAY is likely related to hypoperfusion from complete heart block and severe bradycardia.  B.  I would allow moderate hypertension given presence of complete heart block and severe bradycardia.  IV hydralazine can be used as needed for significant hypertension.  C.  Down the road can consider initiation of amlodipine.    Hypothyroidism.   A.  Appropriately supplemented.  Normal TSH.    35 minutes of critical care time spent with the patient, exclusive of procedures, and entirely in counseling coordination of care.    Angel Martin MD                   Chief Complaint:   No chief complaint on file.             History of Present Illness:   This patient is a 64 year old female with pertinent past medical history pertinent for hypertension, bipolar 1 disorder, gout, impaired glucose tolerance, gastroesophageal reflux disease, subclinical hypothyroidism , chronic kidney disease (baseline creatinine 1.1 (and several other noncardiac comorbidities, who was admitted 5/15/2025 for exertional dizziness/dyspnea that has been present over the past 10 days; she also suffered 2 syncopal  episodes recently, the first 10 days ago and the second prior to admission.  He also describes a cough over the past couple weeks.  She denies any chest pain.  During the first syncopal episode she was standing up changing clothes.  During the second syncopal episode she was sitting up in a chair.  There was no warning on either occasion.  Upon arrival to the ED she was noted to have complete heart block with escape rhythm (QRS duration 120 ms) and heart rate 30 bpm.  Blood pressure was elevated.  She was given atropine and transferred from [facility] to our facility.  She has not had an echocardiogram yet.    While I was in the room with her she had a less than 10-second run of nonsustained polymorphic VT.  She did not see anything at the time; when I questioned her she admitted to mild lightheadedness during this episode.  This was a second episode of polymorphic VT this morning; both episodes less than 10 seconds.    Blood work pertinent for mildly elevated high sensitive cardiac troponin between 250 and 370.  There is also evidence of acute kidney injury with creatinine of 2.4 with subsequent level of hydration.  Mild hyperkalemia with potassium 5.4.  Hemoglobin is normal 11.1 with normal platelets 224.    She has a history of hypertension; home medications include lisinopril 20 mg daily.  She is not on any AV la blocking agents at home.           Review of Systems:     The 10 point Review of Systems is negative other than noted in the HPI                Past Medical History:   I have reviewed this patient's past medical history  Past Medical History:   Diagnosis Date    Bipolar 1 disorder (H)     GERD (gastroesophageal reflux disease)     Gout     HTN (hypertension)     Impaired fasting glucose     Primary open angle glaucoma, both eyes     Subclinical hypothyroidism                Past Surgical History:   I have reviewed this patient's past surgical history  Past Surgical History:   Procedure Laterality Date     TONSILLECTOMY                   Social History:   I have reviewed this patient's social history  Social History     Tobacco Use    Smoking status: Never    Smokeless tobacco: Not on file   Substance Use Topics    Alcohol use: Not Currently               Family History:   I have reviewed this patient's family history  Family History   Problem Relation Age of Onset    Cerebrovascular Disease Maternal Grandmother     Heart Failure Maternal Grandmother     Pacemaker Maternal Grandfather                Medications:   I have reviewed this patient's current medications  Medications Prior to Admission   Medication Sig Dispense Refill Last Dose/Taking    allopurinol (ZYLOPRIM) 300 MG tablet Take 1 tablet by mouth daily (with dinner).       diphenhydrAMINE (BENADRYL) 25 MG tablet Take 50 mg by mouth at bedtime.       divalproex sodium extended-release (DEPAKOTE ER) 500 MG 24 hr tablet Take 1,000 mg by mouth at bedtime. Take 2 tablets (1,000 mg) on even days and 3 tablets (1,500 mg) on odd days.       divalproex sodium extended-release (DEPAKOTE ER) 500 MG 24 hr tablet Take 1,500 mg by mouth at bedtime. Take 3 tablets (1,500 mg) on odd days and 2 tablets (1,000 mg) on even days.       famotidine (PEPCID) 40 MG tablet Take 40 mg by mouth daily as needed for heartburn.       latanoprost (XALATAN) 0.005 % ophthalmic solution Place 1 drop into both eyes at bedtime.       lisinopril (ZESTRIL) 20 MG tablet Take 20 mg by mouth daily.       Multiple Vitamins-Minerals (HAIR SKIN & NAILS PO) Take 1 tablet by mouth daily (with dinner).       OLANZapine (ZYPREXA) 20 MG tablet Take 20 mg by mouth at bedtime.       QUEtiapine (SEROQUEL) 100 MG tablet Take 100-200 mg by mouth at bedtime.        Current Facility-Administered Medications   Medication Dose Route Frequency Provider Last Rate Last Admin    acetaminophen (TYLENOL) tablet 650 mg  650 mg Oral Q4H PRN Vernon Neves MD        Or    acetaminophen (TYLENOL) Suppository 650  mg  650 mg Rectal Q4H PRN Vernon Neves MD        allopurinol (ZYLOPRIM) tablet 300 mg  300 mg Oral Daily with supper Vernon Neves MD        benzocaine-menthol (CHLORASEPTIC) 6-10 MG lozenge 1 lozenge  1 lozenge Buccal Q1H PRN Vernon Neves MD        calcium carbonate (TUMS) chewable tablet 1,000 mg  1,000 mg Oral 4x Daily PRN Vernon Neves MD        diphenhydrAMINE (BENADRYL) capsule 50 mg  50 mg Oral At Bedtime Vernon Neves MD        [START ON 5/16/2025] divalproex sodium extended-release (DEPAKOTE ER) 24 hr tablet 1,000 mg  1,000 mg Oral Every Other Day Vernon Neves MD        divalproex sodium extended-release (DEPAKOTE ER) 24 hr tablet 1,500 mg  1,500 mg Oral Every Other Day Vernon Neves MD        famotidine (PEPCID) tablet 20 mg  20 mg Oral Daily PRN Shaggy Andrade MD        guaiFENesin-dextromethorphan (ROBITUSSIN DM) 100-10 MG/5ML syrup 10 mL  10 mL Oral Q4H PRN Vernon Neves MD   10 mL at 05/15/25 0829    hydrALAZINE (APRESOLINE) injection 10 mg  10 mg Intravenous Q4H PRN Vernon Neves MD        latanoprost (XALATAN) 0.005 % ophthalmic solution 1 drop  1 drop Both Eyes At Bedtime Shaggy Andrade MD        lidocaine (LMX4) cream   Topical Q1H PRN Vernon Neves MD        lidocaine (LMX4) cream   Topical Q1H PRN Vernon Neves MD        lidocaine 1 % 0.1-1 mL  0.1-1 mL Other Q1H PRN Vernon Neves MD        lidocaine 1 % 0.1-1 mL  0.1-1 mL Other Q1H PRN Vernon Neves MD        [Held by provider] lisinopril (ZESTRIL) tablet 20 mg  20 mg Oral Daily Vernon Neves MD        melatonin tablet 5 mg  5 mg Oral At Bedtime PRN Vernon Neves MD        OLANZapine (zyPREXA) tablet 20 mg  20 mg Oral At Bedtime Vernon Neves MD        ondansetron (ZOFRAN ODT) ODT tab 4 mg  4 mg Oral Q6H PRN Vernon Neves MD        Or    ondansetron (ZOFRAN) injection 4 mg  4 mg  "Intravenous Q6H PRN Vernon Neves MD        QUEtiapine (SEROquel) tablet 100-200 mg  100-200 mg Oral At Bedtime Vernon Neves MD   200 mg at 05/15/25 0243    senna-docusate (SENOKOT-S/PERICOLACE) 8.6-50 MG per tablet 1 tablet  1 tablet Oral BID PRN Vernon Neves MD        Or    senna-docusate (SENOKOT-S/PERICOLACE) 8.6-50 MG per tablet 2 tablet  2 tablet Oral BID PRN Vernon Neves MD        sodium chloride (PF) 0.9% PF flush 3 mL  3 mL Intracatheter Q8H Vernon Neves MD   3 mL at 05/15/25 0245    sodium chloride (PF) 0.9% PF flush 3 mL  3 mL Intracatheter q1 min prn Vernon Neves MD        sodium chloride (PF) 0.9% PF flush 3 mL  3 mL Intracatheter Q8H KENNETH Vernon Neves MD        sodium chloride (PF) 0.9% PF flush 3 mL  3 mL Intracatheter q1 min prVernon Thompson MD                   Allergies:     Allergies   Allergen Reactions    Codeine Other (See Comments)     \"knocks out\". Is able to tolerate if she ONLY takes it at night when she is ill                 Physical Exam:   Vitals were reviewed  Blood pressure (!) 171/52, pulse (!) 32, temperature 97.9  F (36.6  C), temperature source Oral, resp. rate 30, height 1.638 m (5' 4.5\"), weight 104 kg (229 lb 4.5 oz), SpO2 95%.  Temperatures:  Current - Temp: 97.9  F (36.6  C); Max - Temp  Av  F (36.7  C)  Min: 97.6  F (36.4  C)  Max: 98.2  F (36.8  C)  Respiration range: Resp  Av.7  Min: 0  Max: 32  Pulse range: Pulse  Av.6  Min: 25  Max: 35  Blood pressure range: Systolic (24hrs), Av , Min:140 , Max:199   ; Diastolic (24hrs), Av, Min:47, Max:83    Pulse oximetry range: SpO2  Av.4 %  Min: 94 %  Max: 99 %  No intake or output data in the 24 hours ending 05/15/25 1048  Constitutional:   Awake, alert, cooperative, no apparent distress, and appears stated age     Eyes:   Lids and lashes normal, extra ocular muscles intact, sclera clear, conjunctiva normal     Neck:   Supple, " symmetrical, trachea midline, no JVD     Back:   Symmetric     Lungs:   No increased work of breathing, good air exchange, clear to auscultation bilaterally, no crackles or wheezing       Cardiovascular:   Regular rate and rhythm, normal S1 and S2, no S3 or S4, and no murmur noted.      Abdomen:   Non-tender     Musculoskeletal:   Grossly normal motor strength in all extremities bilaterally     Neurologic:   Grossly nonfocal     Skin:   no bruising or bleeding     Additional findings:     No edema                 Data:   All laboratory data reviewed  Results for orders placed or performed during the hospital encounter of 05/15/25 (from the past 24 hours)   Glucose by meter   Result Value Ref Range    GLUCOSE BY METER POCT 121 (H) 70 - 99 mg/dL   Troponin T, High Sensitivity   Result Value Ref Range    Troponin T, High Sensitivity 360 (HH) <=14 ng/L   UA Macroscopic with reflex to Microscopic and Culture    Specimen: Urine, Catheter   Result Value Ref Range    Color Urine Straw Colorless, Straw, Light Yellow, Yellow    Appearance Urine Clear Clear    Glucose Urine Negative Negative mg/dL    Bilirubin Urine Negative Negative    Ketones Urine Negative Negative mg/dL    Specific Gravity Urine 1.007 1.003 - 1.035    Blood Urine Negative Negative    pH Urine 6.0 5.0 - 7.0    Protein Albumin Urine Negative Negative mg/dL    Urobilinogen Urine Normal Normal mg/dL    Nitrite Urine Negative Negative    Leukocyte Esterase Urine Negative Negative    Narrative    Microscopic not indicated   Basic metabolic panel   Result Value Ref Range    Sodium 143 135 - 145 mmol/L    Potassium 5.4 (H) 3.4 - 5.3 mmol/L    Chloride 113 (H) 98 - 107 mmol/L    Carbon Dioxide (CO2) 20 (L) 22 - 29 mmol/L    Anion Gap 10 7 - 15 mmol/L    Urea Nitrogen 37.7 (H) 8.0 - 23.0 mg/dL    Creatinine 2.13 (H) 0.51 - 0.95 mg/dL    GFR Estimate 26 (L) >60 mL/min/1.73m2    Calcium 9.4 8.8 - 10.4 mg/dL    Glucose 116 (H) 70 - 99 mg/dL   CBC with platelets  "differential    Narrative    The following orders were created for panel order CBC with platelets differential.  Procedure                               Abnormality         Status                     ---------                               -----------         ------                     CBC with platelets and ...[[id number]]  Abnormal            Final result                 Please view results for these tests on the individual orders.   Troponin T, High Sensitivity   Result Value Ref Range    Troponin T, High Sensitivity 373 (HH) <=14 ng/L   CBC with platelets and differential   Result Value Ref Range    WBC Count 11.2 (H) 4.0 - 11.0 10e3/uL    RBC Count 3.51 (L) 3.80 - 5.20 10e6/uL    Hemoglobin 11.1 (L) 11.7 - 15.7 g/dL    Hematocrit 33.6 (L) 35.0 - 47.0 %    MCV 96 78 - 100 fL    MCH 31.6 26.5 - 33.0 pg    MCHC 33.0 31.5 - 36.5 g/dL    RDW 16.0 (H) 10.0 - 15.0 %    Platelet Count 224 150 - 450 10e3/uL    % Neutrophils 50 %    % Lymphocytes 42 %    % Monocytes 7 %    % Eosinophils 1 %    % Basophils 0 %    % Immature Granulocytes 0 %    NRBCs per 100 WBC 0 <1 /100    Absolute Neutrophils 5.6 1.6 - 8.3 10e3/uL    Absolute Lymphocytes 4.7 0.8 - 5.3 10e3/uL    Absolute Monocytes 0.7 0.0 - 1.3 10e3/uL    Absolute Eosinophils 0.1 0.0 - 0.7 10e3/uL    Absolute Basophils 0.0 0.0 - 0.2 10e3/uL    Absolute Immature Granulocytes 0.1 <=0.4 10e3/uL    Absolute NRBCs 0.0 10e3/uL       CARDIAC ENZYMES:  No lab results found in last 7 days.  Recent Labs   Lab 05/14/25 1944   NTBNP 4,993*       GENERAL:  Recent Labs   Lab 05/15/25  0428 05/15/25  0148 05/14/25 1944   WBC 11.2*  --  13.1*   HGB 11.1*  --  11.7   MCV 96  --  95     --  271     --  141   POTASSIUM 5.4*  --  5.2   CHLORIDE 113*  --  112*   CO2 20*  --  13*   BUN 37.7*  --  43.2*   CR 2.13*  --  2.37*   GFRESTIMATED 26*  --  23*   ANIONGAP 10  --  16*   JAMES 9.4  --  9.4   * 121* 143*       No results for input(s): \"SED\", \"CRP\" in the last 168 " "hours.    No results for input(s): \"DD\" in the last 168 hours.    Recent Labs   Lab 05/14/25  1944   TSH 7.67*       LIPIDS:  No results for input(s): \"CHOL\", \"HDL\", \"LDL\", \"TRIG\", \"CHOLHDLRATIO\" in the last 48274 hours.    BLOOD GASES:  Recent Labs   Lab 05/14/25 2123   PHV 7.39   PO2V 64*   PCO2V 29*   HCO3V 18*       EKG results:  5/14/2025: Normal sinus rhythm with complete heart block with escape rhythm at 32 bpm (QRS duration 120 ms).    Echocardiogram: Pending.          Imaging (past 48 hours):  No results found for this or any previous visit (from the past 48 hours).        Clinically Significant Risk Factors Present on Admission        # Hyperkalemia: Highest K = 5.4 mmol/L in last 2 days, will monitor as appropriate   # Hyperchloremia: Highest Cl = 113 mmol/L in last 2 days, will monitor as appropriate              # Hypertension: Home medication list includes antihypertensive(s)           # Obesity: Estimated body mass index is 38.75 kg/m  as calculated from the following:    Height as of this encounter: 1.638 m (5' 4.5\").    Weight as of this encounter: 104 kg (229 lb 4.5 oz).                          " 22-Jan-2020

## 2025-05-15 NOTE — PROCEDURES
[facility]    Procedure: Temporary pacemaker    Date/Time: 5/15/2025 12:10 PM    Performed by: Miguelito Barker MD  Authorized by: Miguelito Barker MD      UNIVERSAL PROTOCOL   Site Marked: Yes  Prior Images Obtained and Reviewed:  Yes  Required items: Required blood products, implants, devices and special equipment available    Patient identity confirmed:  Verbally with patient  Patient was reevaluated immediately before administering moderate or deep sedation or anesthesia  Confirmation Checklist:  Patient's identity using two indicators  Time out: Immediately prior to the procedure a time out was called    Universal Protocol: the Joint Commission Universal Protocol was followed    Preparation: Patient was prepped and draped in usual sterile fashion       ANESTHESIA    Local Anesthetic:  Lidocaine 1% without epinephrine      SEDATION  Patient Sedated: Yes    Sedation:  Fentanyl and midazolam  Vital signs: Vital signs monitored during sedation      PROCEDURE  Describe Procedure: Procedure  Temporary pacemaker  Approach RFV 5 Fr  Complications none  Indication 3rd degree AV block, pause related PMVT  With a micropuncture needle and ultrasound we accessed RFV. We placed a 4 Fr balloon directed temporary pacemaker in RV apex. Pacing threshold 05 MA. Pacer setting VVI 60 5 MA full demand  No complications noted    Lucero  Length of time physician/provider present for 1:1 monitoring during sedation: 15

## 2025-05-15 NOTE — PROGRESS NOTES
Admission/Transfer from: [facility]  2 RN skin assessment completed. Yes (with Irvin DEJESUS)  Significant findings include: Redness under marily breast and dry feet  WOC Nurse Consult Ordered? No

## 2025-05-15 NOTE — CONSULTS
[facility]    Electrophysiology Consultation     Date of Admission:  5/15/2025  Date of Consult (When we saw the patient): 05/15/25    Assessment & Plan   Evelyn Roa is a 64 year old female with history of HTN, subclinical hypothyroidism, CKD (baseline Cr 1.1) who was admitted on 5/15/2025 for 10 days of exertional dizziness/dyspnea. We were asked to see the patient for high grade AV block.    She endorses significant dizzy spells and dyspnea ongoing over the last week, as well 2 separate syncopal episodes, the 2nd of which prompted her ED visit to [facility] yesterday. At [facility] she was given atropine and transferred to [facility]. On telemetry showing complete AV block and has additionally been observed to have multiple runs of non-sustained VT/Torsades, likely related to bradycardia, which she notes she feels light-headed during. Troponin elevated since evaluation at [facility] (247->373) likely secondary to supply/demand mismatch in setting of heart block, but cardiology monitoring. Questionable etiology behind her high grade AV block at this time, with differentials including acute versus chronic myocardial injury, myocarditis, hyperkalemia (given mild hyperkalemia on presentation and history of CKD), hypothyroidism (presently normal lab levels) or idiopathic. Patient requires intervention with temporary or permanent pacemaker to reduce symptoms and eliminate complications associated with high grade AV block including SCA/SCD.    Plan  Temporary transvenous wiring pacing implantation  We discussed the risks, benefits and indications of temporary pacer wire placement, including but not limited to use of conscious sedation including need for a , discomfort, peripheral vessel injury, pneumothorax, cardiac puncture and/or tamponade, device malfunction and/or recall, and infection requiring explantation of the device and long term antibiotics. We also briefly discussed follow up  expectations and restrictions following the procedure. The patient voiced understanding and is willing to proceed.  A consent form will be signed by the procedural physician.   To be performed STAT  Long-term plan will be permanent pacemaker implantation  Reviewed in brief, will continue to monitor following temp wire implant  NPO  Continuous telemetry    Christopher Devi PA-C    Code Status    Full Code    Reason for Consult   Reason for consult: We were asked by cardiology to evaluate for high grade AV block    Primary Care Physician   Daniela Carter    Chief Complaint   Dizziness/syncope    History is obtained from the patient and EPIC chart.      History of Present Illness   Evelyn Roa is a 64 year old female with history of HTN, subclinical hypothyroidism, CKD (baseline Cr 1.1) who was admitted on 5/15/2025 for 10 days of exertional dizziness/dyspnea. Found to be in complete heart block, with runs of nonsustained VT.     She endorses significant dizzy spells and dyspnea ongoing over the last week, as well 2 separate syncopal episodes, the 2nd of which prompted her ED visit to [place] yesterday. She also endorses lightheadedness when observed to have runs of NSVT on telemetry. Denies chest pain at this time. She notes that her father had a pacemaker, but no other known family history of arrhythmias. Takes losartan for HTN but is not on AV la blocking agents.     Troponin (247->373) today. MURRAY seen with Cr at 2.4. K 5.4.     Past Medical History   I have reviewed this patient's medical history and updated it with pertinent information if needed.   Past Medical History:   Diagnosis Date    Bipolar 1 disorder (H)     GERD (gastroesophageal reflux disease)     Gout     HTN (hypertension)     Impaired fasting glucose     Primary open angle glaucoma, both eyes     Subclinical hypothyroidism        Past Surgical History   I have reviewed this patient's surgical history and updated it with pertinent information if  needed.  Past Surgical History:   Procedure Laterality Date    TONSILLECTOMY         Prior to Admission Medications   Prior to Admission Medications   Prescriptions Last Dose Informant Patient Reported? Taking?   Multiple Vitamins-Minerals (HAIR SKIN & NAILS PO)   Yes No   Sig: Take 1 tablet by mouth daily (with dinner).   OLANZapine (ZYPREXA) 20 MG tablet   Yes No   Sig: Take 20 mg by mouth at bedtime.   QUEtiapine (SEROQUEL) 100 MG tablet   Yes No   Sig: Take 100-200 mg by mouth at bedtime.   allopurinol (ZYLOPRIM) 300 MG tablet   Yes No   Sig: Take 1 tablet by mouth daily (with dinner).   diphenhydrAMINE (BENADRYL) 25 MG tablet   Yes No   Sig: Take 50 mg by mouth at bedtime.   divalproex sodium extended-release (DEPAKOTE ER) 500 MG 24 hr tablet   Yes No   Sig: Take 1,000 mg by mouth at bedtime. Take 2 tablets (1,000 mg) on even days and 3 tablets (1,500 mg) on odd days.   divalproex sodium extended-release (DEPAKOTE ER) 500 MG 24 hr tablet   Yes No   Sig: Take 1,500 mg by mouth at bedtime. Take 3 tablets (1,500 mg) on odd days and 2 tablets (1,000 mg) on even days.   famotidine (PEPCID) 40 MG tablet   Yes No   Sig: Take 40 mg by mouth daily as needed for heartburn.   latanoprost (XALATAN) 0.005 % ophthalmic solution   Yes No   Sig: Place 1 drop into both eyes at bedtime.   lisinopril (ZESTRIL) 20 MG tablet   Yes No   Sig: Take 20 mg by mouth daily.      Facility-Administered Medications: None         Medications   Current Facility-Administered Medications   Medication Dose Route Frequency Provider Last Rate Last Admin     Current Facility-Administered Medications   Medication Dose Route Frequency Provider Last Rate Last Admin    allopurinol (ZYLOPRIM) tablet 300 mg  300 mg Oral Daily with supper Vernon Neves MD        diphenhydrAMINE (BENADRYL) capsule 50 mg  50 mg Oral At Bedtime Vernon Neves MD        [START ON 5/16/2025] divalproex sodium extended-release (DEPAKOTE ER) 24 hr tablet 1,000 mg  " 1,000 mg Oral Every Other Day Vernon Neves MD        divalproex sodium extended-release (DEPAKOTE ER) 24 hr tablet 1,500 mg  1,500 mg Oral Every Other Day Vernon Neves MD        latanoprost (XALATAN) 0.005 % ophthalmic solution 1 drop  1 drop Both Eyes At Bedtime Shaggy Andrade MD        [Held by provider] lisinopril (ZESTRIL) tablet 20 mg  20 mg Oral Daily Vernon Neves MD        OLANZapine (zyPREXA) tablet 20 mg  20 mg Oral At Bedtime Vernon Neves MD        QUEtiapine (SEROquel) tablet 100-200 mg  100-200 mg Oral At Bedtime Vernon Neves MD   200 mg at 05/15/25 0243    sodium chloride (PF) 0.9% PF flush 3 mL  3 mL Intracatheter Q8H Vernon Neves MD   3 mL at 05/15/25 0245    sodium chloride (PF) 0.9% PF flush 3 mL  3 mL Intracatheter Q8H [facility] Vernon Neves MD           Allergies   Allergies   Allergen Reactions    Codeine Other (See Comments)     \"knocks out\". Is able to tolerate if she ONLY takes it at night when she is ill       Social History   I have updated and reviewed the following Social History Narrative:   Social History     Social History Narrative    Not on file       Family History   I have reviewed this patient's family history and updated it with pertinent information if needed.   Family History   Problem Relation Age of Onset    Cerebrovascular Disease Maternal Grandmother     Heart Failure Maternal Grandmother     Pacemaker Maternal Grandfather        Review of Systems   A complete 10-point review of systems was done and is negative with the exceptions noted in HPI.      Physical Exam   Temp: 97.9  F (36.6  C) Temp src: Oral BP: (!) 171/52 Pulse: (!) 32   Resp: 30 SpO2: 95 % O2 Device: None (Room air)    Vital Signs with Ranges  Temp:  [97.6  F (36.4  C)-98.2  F (36.8  C)] 97.9  F (36.6  C)  Pulse:  [25-35] 32  Resp:  [0-32] 30  BP: (140-199)/(47-83) 171/52  SpO2:  [94 %-99 %] 95 %  229 lbs 4.45 oz    Telemetry: complete AV " block, multiple runs of non-sustained polymorphic VT, rates 30-40's    Constitutional: awake, alert, cooperative, no apparent distress, and appears stated age  Respiratory: No increased work of breathing, good air exchange, clear to auscultation bilaterally, no crackles or wheezing  Cardiovascular: irregular rate and rhythm, no murmurs/rubs/clicks    Data   I personally reviewed the EKG tracing showing significant sinus bradycardia (32 BPM) with high grade AV block, no obvious ST changes, per my read (5/14/25).  Results for orders placed or performed during the hospital encounter of 05/15/25 (from the past 24 hours)   Glucose by meter   Result Value Ref Range    GLUCOSE BY METER POCT 121 (H) 70 - 99 mg/dL   Troponin T, High Sensitivity   Result Value Ref Range    Troponin T, High Sensitivity 360 (HH) <=14 ng/L   UA Macroscopic with reflex to Microscopic and Culture    Specimen: Urine, Catheter   Result Value Ref Range    Color Urine Straw Colorless, Straw, Light Yellow, Yellow    Appearance Urine Clear Clear    Glucose Urine Negative Negative mg/dL    Bilirubin Urine Negative Negative    Ketones Urine Negative Negative mg/dL    Specific Gravity Urine 1.007 1.003 - 1.035    Blood Urine Negative Negative    pH Urine 6.0 5.0 - 7.0    Protein Albumin Urine Negative Negative mg/dL    Urobilinogen Urine Normal Normal mg/dL    Nitrite Urine Negative Negative    Leukocyte Esterase Urine Negative Negative    Narrative    Microscopic not indicated   Basic metabolic panel   Result Value Ref Range    Sodium 143 135 - 145 mmol/L    Potassium 5.4 (H) 3.4 - 5.3 mmol/L    Chloride 113 (H) 98 - 107 mmol/L    Carbon Dioxide (CO2) 20 (L) 22 - 29 mmol/L    Anion Gap 10 7 - 15 mmol/L    Urea Nitrogen 37.7 (H) 8.0 - 23.0 mg/dL    Creatinine 2.13 (H) 0.51 - 0.95 mg/dL    GFR Estimate 26 (L) >60 mL/min/1.73m2    Calcium 9.4 8.8 - 10.4 mg/dL    Glucose 116 (H) 70 - 99 mg/dL   CBC with platelets differential    Narrative    The following  orders were created for panel order CBC with platelets differential.  Procedure                               Abnormality         Status                     ---------                               -----------         ------                     CBC with platelets and ...[[id number]]  Abnormal            Final result                 Please view results for these tests on the individual orders.   Troponin T, High Sensitivity   Result Value Ref Range    Troponin T, High Sensitivity 373 (HH) <=14 ng/L   CBC with platelets and differential   Result Value Ref Range    WBC Count 11.2 (H) 4.0 - 11.0 10e3/uL    RBC Count 3.51 (L) 3.80 - 5.20 10e6/uL    Hemoglobin 11.1 (L) 11.7 - 15.7 g/dL    Hematocrit 33.6 (L) 35.0 - 47.0 %    MCV 96 78 - 100 fL    MCH 31.6 26.5 - 33.0 pg    MCHC 33.0 31.5 - 36.5 g/dL    RDW 16.0 (H) 10.0 - 15.0 %    Platelet Count 224 150 - 450 10e3/uL    % Neutrophils 50 %    % Lymphocytes 42 %    % Monocytes 7 %    % Eosinophils 1 %    % Basophils 0 %    % Immature Granulocytes 0 %    NRBCs per 100 WBC 0 <1 /100    Absolute Neutrophils 5.6 1.6 - 8.3 10e3/uL    Absolute Lymphocytes 4.7 0.8 - 5.3 10e3/uL    Absolute Monocytes 0.7 0.0 - 1.3 10e3/uL    Absolute Eosinophils 0.1 0.0 - 0.7 10e3/uL    Absolute Basophils 0.0 0.0 - 0.2 10e3/uL    Absolute Immature Granulocytes 0.1 <=0.4 10e3/uL    Absolute NRBCs 0.0 10e3/uL       Clinically Significant Risk Factors Present on Admission        # Hyperkalemia: Highest K = 5.4 mmol/L in last 2 days, will monitor as appropriate     # Hyperchloremia: Highest Cl = 113 mmol/L in last 2 days, will monitor as appropriate                     HTN    CKD (Cr 1.1)    Subclinical hypothyroidism    GERD

## 2025-05-15 NOTE — ED NOTES
No changes with rate or rhythm after atropine administration, provider in room during administration.

## 2025-05-15 NOTE — MEDICATION SCRIBE - ADMISSION MEDICATION HISTORY
Medication Scribe Admission Medication History    Admission medication history is complete. The information provided in this note is only as accurate as the sources available at the time of the update.    Information Source(s): Patient and CareEverywhere/SureScripts via in-person    Pertinent Information: Patient forgot lisinopril this morning and took it later at about 1900 PTA. Generally most medications are taken in the evening and did take all of these last night.     Divalproex is as follows:  - on even dates take 2 tablets (1,000 mg)  - on odd dates take 3 tablets (1,500 mg)    Patient does not have her latanoprost with and does NOT want this dispensed for inpatient use.     Changes made to PTA medication list:  Added:   Allopurinol 100 mg  Diphenhydramine 25 mg  Divalproex  mg  Hair, skin, nails  Famotidine 40 mg  Latanoprost 0.005% ophthalmic  Lisinopril 20 mg  Olanzapine 20 mg  Quetiapine 100 mg  Deleted: None  Changed: None    Allergies reviewed with patient and updates made in EHR: yes    Medication History Completed By: Frankie Nix 5/14/2025 10:01 PM    PTA Med List   Medication Sig Note Last Dose/Taking    diphenhydrAMINE (BENADRYL) 25 MG tablet Take 50 mg by mouth at bedtime.  5/13/2025 Bedtime    divalproex sodium extended-release (DEPAKOTE ER) 500 MG 24 hr tablet Take 1,500 mg by mouth at bedtime. Take 3 tablets (1,500 mg) on odd days and 2 tablets (1,000 mg) on even days.  5/13/2025 Bedtime    Multiple Vitamins-Minerals (HAIR SKIN & NAILS PO) Take 1 tablet by mouth daily (with dinner).  5/13/2025 Evening    allopurinol (ZYLOPRIM) 300 MG tablet Take 1 tablet by mouth daily (with dinner).  5/13/2025 Evening    divalproex sodium extended-release (DEPAKOTE ER) 500 MG 24 hr tablet Take 1,000 mg by mouth at bedtime. Take 2 tablets (1,000 mg) on even days and 3 tablets (1,500 mg) on odd days.  5/12/2025 Bedtime    famotidine (PEPCID) 40 MG tablet Take 40 mg by mouth daily as needed for  heartburn.  Past Week    latanoprost (XALATAN) 0.005 % ophthalmic solution Place 1 drop into both eyes at bedtime.  5/13/2025 Bedtime    lisinopril (ZESTRIL) 20 MG tablet Take 20 mg by mouth daily.  5/14/2025 at  7:00 PM    OLANZapine (ZYPREXA) 20 MG tablet Take 20 mg by mouth at bedtime.  5/13/2025 Bedtime    QUEtiapine (SEROQUEL) 100 MG tablet Take 100-200 mg by mouth at bedtime. 5/14/2025: Usually only takes 100 mg 5/13/2025 Bedtime

## 2025-05-15 NOTE — PRE-PROCEDURE
GENERAL PRE-PROCEDURE:   Procedure:  Temporary pacemaker  Date/Time:  5/15/2025 11:49 AM    Verbal consent obtained?: Yes    Written consent obtained?: Yes    Emergent situation    Risks and benefits: Risks, benefits and alternatives were discussed    DC Plan: Appropriate discharge home plan in place for patients who are going home after procedure   Consent given by:  Patient  Patient states understanding of procedure being performed: Yes    Patient's understanding of procedure matches consent: Yes    Procedure consent matches procedure scheduled: Yes    Expected level of sedation:  Moderate  Appropriately NPO:  Yes  ASA Class:  2  Lungs:  Lungs clear with good breath sounds bilaterally  Heart:  Normal heart sounds and rate  Statement of review:  I have reviewed the lab findings, diagnostic data, medications, and the plan for sedation  Ms. Roa is sent to cath lab for urgent PPM in setting of polymorphous VT and 3rd degree AVBlock. We have discussed the risks of bleed, infection and plans for permanent PPM, so temporary device needed to suppress bradycardia related PMVT. [place]

## 2025-05-15 NOTE — ED PROVIDER NOTES
EMERGENCY DEPARTMENT ENCOUNTER      NAME: Evelyn Roa  AGE: 64 year old female  YOB: 1960  MRN: [id number]  EVALUATION DATE & TIME: 5/14/2025  7:32 PM    PCP: Daniela Carter    ED PROVIDER: Calvin Torres M.D.    Chief Complaint   Patient presents with    Syncope       FINAL IMPRESSION:  No diagnosis found.    ED COURSE & MEDICAL DECISION MAKING:    Pertinent Labs & Imaging studies independently interpreted by me. (See chart for details)  Reviewed prior primary care visit from December 2024 which was follow-up for bipolar disorder and poor sleep, patient on Zyprexa, Seroquel, lisinopril, allopurinol, Depakote  ED Course as of 05/14/25 2333   Wed May 14, 2025   1951 Patient seen and examined, presents today after a syncopal episode at home while sitting on the couch.  Fell off the couch but did not hit her head, denies any other injury.  On further interview it sounds like she has felt weak and fatigued all day today, no chest pain.  On exam here, patient is found to be in complete heart block, mentating appropriately and blood pressure 170-180 over 80s.  Labs are ordered to check for reversible causes of this, patient is not on beta-blockers or calcium channel blockers.  Will discuss with cardiology, plan for transfer for pacemaker placement.   1952 EKG:    Independently reviewed and interpreted by me  Performed at: 7:41 PM  Impression: Third-degree block with idioventricular rhythm  Rate: 32 ventricular rate  Rhythm: Third-degree block  Axis: Left  QRS Interval: 120  QTc Interval: 449  ST Changes: No acute ischemic changes  Comparison:    1956 Care discussed with Dr. Lazar, cardiology who requests a lactate be done and agrees with plan for transfer, echocardiogram in the morning.   2101 Care discussed with Dr. Hinojosa, hospitalist at [facility] as no beds are available at [facility], requested that I review care with cardiology at [facility].   2102 Labs independently interpreted by me  with elevated BNP 4993, troponin 130 but creatinine 2.37.  Prior creatinine in June 2024 was 1.4, unclear if this is acute related to hypoperfusion for bradycardia or more chronic.  Patient also with an anion gap acidosis.   2111 Care discussed with cardiology at [facility], no further recommendations other than small fluid bolus in the department due to elevated creatinine, cautious as he no known EF and BNP is slightly elevated, plan for pacemaker tomorrow.   2215 Repeat troponin 247, will discuss with [facility] cardiology.   2234 Updated Dr. Kearns, cardiology at [facility] with rising troponin, as patient has no chest pain, no heparin for now.   2303 Patient given atropine with no improvement in rate.   2332 Signout to oncoming provider pending transfer.  Updated patient and family, remains bradycardic but hypertensive and mentating appropriately         At the conclusion of the encounter I discussed the results of all of the tests and the disposition. The questions were answered. The patient or family acknowledged understanding and was agreeable with the care plan.     Medical Decision Making      Transfer for admission and pacemaker      PROCEDURES:     Critical Care   Performed by: Dr Calvin Torres  Total critical care time: 170 minutes  Critical care was necessary to treat or prevent imminent or life-threatening deterioration of the following conditions: Complete heart block, admission  Critical care was time spent personally by me on the following activities: development of treatment plan with patient or surrogate, discussions with consultants, examination of patient, evaluation of patient's response to treatment, obtaining history from patient or surrogate, ordering and performing treatments and interventions, ordering and review of laboratory studies, ordering and review of radiographic studies, re-evaluation of patient's condition and monitoring for potential decompensation.  Critical care time was  "exclusive of separately billable procedures and treating other patients.      MEDICATIONS GIVEN IN THE EMERGENCY:  Medications - No data to display    NEW PRESCRIPTIONS STARTED AT TODAY'S ER VISIT  New Prescriptions    No medications on file       =================================================================    HPI    Patient information was obtained from: patient, daughter      Evelyn Roa is a 64 year old female with a pertinent history of bipolar disorder, manic type who presents to this ED for evaluation of syncope.  Patient reports she was sitting on her couch tonight, then she was on the floor.  Unknown downtime but was brief.  She denies any head injury or pain.  She denies preceding palpitations or chest pain.  She does note that she did not go to work today because she has felt weak and short of breath.  She also notes about a week ago she had a syncopal episode while putting on her jeans, bent forward and then woke up about 10 minutes later on the floor, denies injury at that time as well.  Notes poor sleep and increased stress as her  is currently hospitalized at [facility].      REVIEW OF SYSTEMS   Review of Systems   All other systems reviewed and negative    PAST MEDICAL HISTORY:  No past medical history on file.    PAST SURGICAL HISTORY:  No past surgical history on file.    CURRENT MEDICATIONS:    No current facility-administered medications for this encounter.     No current outpatient medications on file.       ALLERGIES:  Allergies   Allergen Reactions    Codeine Other (See Comments)     \"knocks out\". Is able to tolerate if she ONLY takes it at night when she is ill       FAMILY HISTORY:  No family history on file.    SOCIAL HISTORY:   Social History     Socioeconomic History    Marital status:      Social Drivers of Health     Financial Resource Strain: Medium Risk (1/9/2024)    Received from [facility]    Financial Resource Strain     Difficulty " "of Paying Living Expenses: 2     Difficulty of Paying Living Expenses: 1   Food Insecurity: No Food Insecurity (1/9/2024)    Received from [facility]    Food Insecurity     Do you worry your food will run out before you are able to buy more?: 1   Transportation Needs: No Transportation Needs (1/9/2024)    Received from [facility]    Transportation Needs     Does lack of transportation keep you from medical appointments?: 1     Does lack of transportation keep you from work, meetings or getting things that you need?: 1   Social Connections: Socially Integrated (1/9/2024)    Received from [facility]    Social Connections     Do you often feel lonely or isolated from those around you?: 0   Housing Stability: Low Risk  (1/9/2024)    Received from [facility]    Housing Stability     What is your housing situation today?: 1       VITALS:  Pulse (!) 30   Resp 22   Ht 1.651 m (5' 5\")   Wt 99.8 kg (220 lb)   SpO2 99%   BMI 36.61 kg/m      PHYSICAL EXAM:  Physical Exam  Vitals and nursing note reviewed.   Constitutional:       Appearance: Normal appearance.   HENT:      Head: Normocephalic and atraumatic.      Right Ear: External ear normal.      Left Ear: External ear normal.      Nose: Nose normal.      Mouth/Throat:      Mouth: Mucous membranes are moist.   Eyes:      Extraocular Movements: Extraocular movements intact.      Conjunctiva/sclera: Conjunctivae normal.      Pupils: Pupils are equal, round, and reactive to light.   Cardiovascular:      Rate and Rhythm: Bradycardia present. Rhythm irregular.   Pulmonary:      Effort: Pulmonary effort is normal.      Breath sounds: Normal breath sounds. No wheezing or rales.   Abdominal:      General: Abdomen is flat. There is no distension.      Palpations: Abdomen is soft.      Tenderness: There is no abdominal tenderness. There is no " guarding.   Musculoskeletal:         General: Normal range of motion.      Cervical back: Normal range of motion and neck supple.      Right lower leg: No edema.      Left lower leg: No edema.   Lymphadenopathy:      Cervical: No cervical adenopathy.   Skin:     General: Skin is warm and dry.   Neurological:      General: No focal deficit present.      Mental Status: She is alert and oriented to person, place, and time. Mental status is at baseline.      Comments: No gross focal neurologic deficits   Psychiatric:         Mood and Affect: Mood normal.         Behavior: Behavior normal.         Thought Content: Thought content normal.          LAB:  All pertinent labs reviewed and interpreted.       RADIOLOGY:  Reviewed all pertinent imaging. Please see official radiology report.  No orders to display     Calvin Torres M.D.  Emergency Medicine  [facility] EMERGENCY ROOM  [address] [address]  [phone]  Dept: [phone]       Calvin Torres MD  05/14/25 5995

## 2025-05-15 NOTE — ED TRIAGE NOTES
Patient presents to the ED complaining of a syncopal episode at home while sitting on the couch, she found herself on the floor after.  She is complaining of shortness of breath and dizziness.  Denies chest pain.     Triage Assessment (Adult)       Row Name 05/14/25 1939          Triage Assessment    Airway WDL WDL        Respiratory WDL    Respiratory WDL WDL        Skin Circulation/Temperature WDL    Skin Circulation/Temperature WDL WDL        Cardiac WDL    Cardiac WDL X;rhythm     Pulse Rate & Regularity bradycardic        Peripheral/Neurovascular WDL    Peripheral Neurovascular WDL WDL        Cognitive/Neuro/Behavioral WDL    Cognitive/Neuro/Behavioral WDL WDL

## 2025-05-15 NOTE — H&P
[facility]    History and Physical  Hospitalist       Date of Admission:  5/15/2025  Date of Service (when I saw the patient): 05/15/25    ASSESSMENT  Evelyn Roa is a markedly pleasant 64 year old woman with past medical history that is most notable for hypertension, gout, and Bipolar 1 Disorder, among others; who presents with cough, exertional dyspnea, and syncope, and is found to have high grade AV block and elevated Troponin, as well as MURRAY.    PLAN     High grade AV block and elevated Troponin: Of note, Ms. Roa has never smoked. She has hypertension. She denies previous history of CAD, CHF, stroke, or diabetes.     Now, 05/15/25 she presents for evaluation of cough, exertional dyspnea, and syncope. In the ED, she has accelerated hypertension, and sever bradycardia as low as 25. She is afebrile, without hypoxia at rest. She has acute leukocytosis to 13.1. Lactate is normal. She has MURRAY and acute metabolic acidosis, as discussed below. TSH is 7.67, FT4 normal at 0.97. Pro BNP is elevated at 4993. Troponin T levels are 130 and then 247. EKG shows sinus bradycardia with rate 32, A-V dissociation, and idioventricular escape beats. Valproic acid level is normal at 55.9    Overall, her symptoms are consistent with new onset high grade AV block, of unclear etiology; she denies recent symptoms of infection beyond her non-productive cough. Elevated Troponin could be due to underlying CAD and ACS as the cause, vs due to demand ischemia from acute illness. She could have acute systolic or diastolic CHF. She is not on any AV la blockers as an outpatient.       -- IMC. NPO. Telemetry, serial enzymes, TTE. Cardiology consulted. Per initial guidance, plan to hold Heparin infusion for now. CXR and UA ordered    -- Careful monitoring overnight, if heart rates worsen or she becomes hypotensive, would consider emergent consultation for temporary pacing    -- Repeat CBC and BMP 5/16/25. MARCE  consulted for disposition planning.    MURRAY and acute metabolic acidosis: CO2 is 13, Cr 2.37. Cr was 1.4 in 6/2024. Suspect due to modest hypovolemia, at least in part; vs cardiorenal syndrome. IVF was given in the ED. Repeat BMP 05/15/25     Recent Labs   Lab Test 05/14/25 1944   CR 2.37*     Accelerated hypertension: Home Lisinopril held for now for MURRAY. PRN IV Hydralazine ordered    Bipolar 1 Disorder: In remission. On Depakote, Zyprexa, and Seroquel; resume when verified     Gout: Resume home Allopurinol when verified.  GERD: Resume Pepcid when verified  Glaucoma: Resume home eye drops when verified.    I have spent 75 minutes on the date of service doing chart review, history, examination, documentation, and further activities per the note.    Chief Complaint   Cough    History is obtained from the patient and the ED physician whom I have spoken with    History of Present Illness   Evelyn Roa is a markedly pleasant 64 year old woman who presents with cough. She says that about a month ago, she developed a new, non-productive cough, which has been constant since onset. She has developed associated dizziness when standing or walking, as well as shortness of breath when trying to mount stairs, which is not normal for her. About 10 days ago, she was standing up changing clothes and wound up on the floor with an ecchymosis on her forehead, evidently having had syncope. She has noted progressive leg heaviness bilaterally. Then today, she was sitting in a chair, and wound up on the floor again, having had another episode of syncope. She came to the [facility] ED for further evaluation. She denies syncope previous to these spells. She denies recent or current fever, chills, sweats, or any chest pain or palpitations, or any vomiting or diarrhea (though she does endorse chronic intermittent loose stool), or low appetite or PO intake, or any ETOH or NSAID use, or indeed any other acute complaints.    In the ED,  pulse was as low as 25. Initial BP appears to have been 188/108, though not clearly documented. SBP range has been 140-199. T was 98.2. O2 sats 95-99%.    CBC and BMP were notable for WBC 13.1, Cl 112, CO2 13, BUN 43.2, Cr 2.37, AG 16, Glucose 143,  otherwise were within the normal reference range. Lactate was 1.8. VBG showed 7.39/29. K was 5.2, Mag 1.8. TSH was 7.67, FT4 normal at 0.97. Pro BNP was 4993. Troponin T was 130 and then 247. EKG showed sinus bradycardia with rate 32, A-V dissociation, and idioventricular escape beats. Valproic acid level was normal at 55.9.    The case was discussed with Cardiology in the ED. She was given Atropine and IV fluid and transferred here.    PHYSICAL EXAM  Blood pressure (!) 167/55, pulse (!) 30, temperature 98.1  F (36.7  C), temperature source Oral, resp. rate 22, SpO2 96%.  Constitutional: Alert and oriented to person, place and time; no apparent distress; coughing  Respiratory: lungs have faint crackles to auscultation bilaterally  Cardiovascular: regular S1 S2  GI: abdomen soft non tender non distended bowel sounds positive  Musculoskeletal: mild to moderate bilateral LE edema  Neurologic: extra-ocular muscles intact; moves all four extremities  Psychiatric: appropriate affect, insight and judgment     DVT Prophylaxis: Pneumatic Compression Devices  Code Status: Full Code    Medically Ready for Discharge: Anticipated in 2-4 Days       Vernon Neves MD, MD    Past Medical History    I have reviewed this patient's medical history and updated it with pertinent information if needed.   Past Medical History:   Diagnosis Date    Bipolar 1 disorder (H)     GERD (gastroesophageal reflux disease)     Gout     HTN (hypertension)     Impaired fasting glucose     Primary open angle glaucoma, both eyes     Subclinical hypothyroidism        Past Surgical History   I have reviewed this patient's surgical history and updated it with pertinent information if needed.  Past  "Surgical History:   Procedure Laterality Date    TONSILLECTOMY         Prior to Admission Medications   Prior to Admission Medications   Prescriptions Last Dose Informant Patient Reported? Taking?   Multiple Vitamins-Minerals (HAIR SKIN & NAILS PO)   Yes No   Sig: Take 1 tablet by mouth daily (with dinner).   OLANZapine (ZYPREXA) 20 MG tablet   Yes No   Sig: Take 20 mg by mouth at bedtime.   QUEtiapine (SEROQUEL) 100 MG tablet   Yes No   Sig: Take 100-200 mg by mouth at bedtime.   allopurinol (ZYLOPRIM) 300 MG tablet   Yes No   Sig: Take 1 tablet by mouth daily (with dinner).   diphenhydrAMINE (BENADRYL) 25 MG tablet   Yes No   Sig: Take 50 mg by mouth at bedtime.   divalproex sodium extended-release (DEPAKOTE ER) 500 MG 24 hr tablet   Yes No   Sig: Take 1,000 mg by mouth at bedtime. Take 2 tablets (1,000 mg) on even days and 3 tablets (1,500 mg) on odd days.   divalproex sodium extended-release (DEPAKOTE ER) 500 MG 24 hr tablet   Yes No   Sig: Take 1,500 mg by mouth at bedtime. Take 3 tablets (1,500 mg) on odd days and 2 tablets (1,000 mg) on even days.   famotidine (PEPCID) 40 MG tablet   Yes No   Sig: Take 40 mg by mouth daily as needed for heartburn.   latanoprost (XALATAN) 0.005 % ophthalmic solution   Yes No   Sig: Place 1 drop into both eyes at bedtime.   lisinopril (ZESTRIL) 20 MG tablet   Yes No   Sig: Take 20 mg by mouth daily.      Facility-Administered Medications: None     Allergies   Allergies   Allergen Reactions    Codeine Other (See Comments)     \"knocks out\". Is able to tolerate if she ONLY takes it at night when she is ill       Social History   I have reviewed this patient's social history and updated it with pertinent information if needed. Evelyn MAYA Roa  reports that she has never smoked. She does not have any smokeless tobacco history on file. She reports that she does not currently use alcohol.    Family History   Family history assessed and, except as above, is non-contributory.    Family " History   Problem Relation Age of Onset    Cerebrovascular Disease Maternal Grandmother     Heart Failure Maternal Grandmother     Pacemaker Maternal Grandfather        Review of Systems   The 10 point Review of Systems is negative other than noted in the HPI or here.     Primary Care Physician   Daniela Carter    Data   Labs Ordered and Resulted from Time of ED Arrival to Time of ED Departure - No data to display    Data reviewed today:  I personally reviewed the EKG tracing showing 3rd degree AVB.

## 2025-05-15 NOTE — PLAN OF CARE
Patient was admitted from [facility] around 0200. Alert and oriented x4. Hypertensive but not within the range for hydralazine. HR in the 20s and 30s. On RA. Reported slight dizziness with movement and SOB d/t cough. 3rd deg heart block on tele. Denies chest pain. Purewick in place with adequate urine output. Robitussin given once for cough.      Aggression Stop Light: Green

## 2025-05-16 ENCOUNTER — APPOINTMENT (OUTPATIENT)
Dept: GENERAL RADIOLOGY | Facility: CLINIC | Age: 65
End: 2025-05-16
Attending: INTERNAL MEDICINE
Payer: COMMERCIAL

## 2025-05-16 LAB
ANION GAP SERPL CALCULATED.3IONS-SCNC: 9 MMOL/L (ref 7–15)
BUN SERPL-MCNC: 27.1 MG/DL (ref 8–23)
CALCIUM SERPL-MCNC: 9.3 MG/DL (ref 8.8–10.4)
CHLORIDE SERPL-SCNC: 114 MMOL/L (ref 98–107)
CREAT SERPL-MCNC: 2.01 MG/DL (ref 0.51–0.95)
EGFRCR SERPLBLD CKD-EPI 2021: 27 ML/MIN/1.73M2
ERYTHROCYTE [DISTWIDTH] IN BLOOD BY AUTOMATED COUNT: 16 % (ref 10–15)
GLUCOSE SERPL-MCNC: 143 MG/DL (ref 70–99)
HCO3 SERPL-SCNC: 20 MMOL/L (ref 22–29)
HCT VFR BLD AUTO: 33.9 % (ref 35–47)
HGB BLD-MCNC: 11.2 G/DL (ref 11.7–15.7)
MCH RBC QN AUTO: 31.5 PG (ref 26.5–33)
MCHC RBC AUTO-ENTMCNC: 33 G/DL (ref 31.5–36.5)
MCV RBC AUTO: 95 FL (ref 78–100)
PLATELET # BLD AUTO: 183 10E3/UL (ref 150–450)
POTASSIUM SERPL-SCNC: 5.2 MMOL/L (ref 3.4–5.3)
RBC # BLD AUTO: 3.56 10E6/UL (ref 3.8–5.2)
SODIUM SERPL-SCNC: 143 MMOL/L (ref 135–145)
WBC # BLD AUTO: 13.6 10E3/UL (ref 4–11)

## 2025-05-16 PROCEDURE — 99232 SBSQ HOSP IP/OBS MODERATE 35: CPT | Performed by: HOSPITALIST

## 2025-05-16 PROCEDURE — 99232 SBSQ HOSP IP/OBS MODERATE 35: CPT | Performed by: INTERNAL MEDICINE

## 2025-05-16 PROCEDURE — 250N000013 HC RX MED GY IP 250 OP 250 PS 637: Performed by: PHYSICIAN ASSISTANT

## 2025-05-16 PROCEDURE — 999N000065 XR CHEST 2 VIEWS

## 2025-05-16 PROCEDURE — 36415 COLL VENOUS BLD VENIPUNCTURE: CPT | Performed by: HOSPITALIST

## 2025-05-16 PROCEDURE — 250N000013 HC RX MED GY IP 250 OP 250 PS 637: Performed by: HOSPITALIST

## 2025-05-16 PROCEDURE — 02PA3MZ REMOVAL OF CARDIAC LEAD FROM HEART, PERCUTANEOUS APPROACH: ICD-10-PCS | Performed by: INTERNAL MEDICINE

## 2025-05-16 PROCEDURE — 210N000002 HC R&B HEART CARE

## 2025-05-16 PROCEDURE — 80048 BASIC METABOLIC PNL TOTAL CA: CPT | Performed by: HOSPITALIST

## 2025-05-16 PROCEDURE — 02H63JZ INSERTION OF PACEMAKER LEAD INTO RIGHT ATRIUM, PERCUTANEOUS APPROACH: ICD-10-PCS | Performed by: INTERNAL MEDICINE

## 2025-05-16 PROCEDURE — 250N000011 HC RX IP 250 OP 636: Performed by: PHYSICIAN ASSISTANT

## 2025-05-16 PROCEDURE — 99024 POSTOP FOLLOW-UP VISIT: CPT | Mod: FS | Performed by: PHYSICIAN ASSISTANT

## 2025-05-16 PROCEDURE — 02HK3JZ INSERTION OF PACEMAKER LEAD INTO RIGHT VENTRICLE, PERCUTANEOUS APPROACH: ICD-10-PCS | Performed by: INTERNAL MEDICINE

## 2025-05-16 PROCEDURE — 85048 AUTOMATED LEUKOCYTE COUNT: CPT | Performed by: HOSPITALIST

## 2025-05-16 PROCEDURE — 0JH606Z INSERTION OF PACEMAKER, DUAL CHAMBER INTO CHEST SUBCUTANEOUS TISSUE AND FASCIA, OPEN APPROACH: ICD-10-PCS | Performed by: INTERNAL MEDICINE

## 2025-05-16 PROCEDURE — 250N000013 HC RX MED GY IP 250 OP 250 PS 637: Performed by: INTERNAL MEDICINE

## 2025-05-16 PROCEDURE — 02K83ZZ MAP CONDUCTION MECHANISM, PERCUTANEOUS APPROACH: ICD-10-PCS | Performed by: INTERNAL MEDICINE

## 2025-05-16 RX ORDER — AMLODIPINE BESYLATE 5 MG/1
5 TABLET ORAL DAILY
Status: DISCONTINUED | OUTPATIENT
Start: 2025-05-16 | End: 2025-05-19 | Stop reason: HOSPADM

## 2025-05-16 RX ORDER — FUROSEMIDE 10 MG/ML
20 INJECTION INTRAMUSCULAR; INTRAVENOUS ONCE
Status: COMPLETED | OUTPATIENT
Start: 2025-05-16 | End: 2025-05-16

## 2025-05-16 RX ORDER — METOPROLOL SUCCINATE 25 MG/1
25 TABLET, EXTENDED RELEASE ORAL DAILY
Status: DISCONTINUED | OUTPATIENT
Start: 2025-05-16 | End: 2025-05-19 | Stop reason: HOSPADM

## 2025-05-16 RX ORDER — LOPERAMIDE HYDROCHLORIDE 2 MG/1
2 CAPSULE ORAL 4 TIMES DAILY PRN
Status: DISCONTINUED | OUTPATIENT
Start: 2025-05-16 | End: 2025-05-19 | Stop reason: HOSPADM

## 2025-05-16 RX ADMIN — FUROSEMIDE 20 MG: 10 INJECTION, SOLUTION INTRAMUSCULAR; INTRAVENOUS at 09:35

## 2025-05-16 RX ADMIN — GUAIFENESIN AND DEXTROMETHORPHAN 10 ML: 100; 10 SYRUP ORAL at 21:42

## 2025-05-16 RX ADMIN — QUETIAPINE FUMARATE 100 MG: 25 TABLET ORAL at 21:43

## 2025-05-16 RX ADMIN — LATANOPROST 1 DROP: 50 SOLUTION OPHTHALMIC at 22:31

## 2025-05-16 RX ADMIN — DIVALPROEX SODIUM 1000 MG: 500 TABLET, FILM COATED, EXTENDED RELEASE ORAL at 21:51

## 2025-05-16 RX ADMIN — OLANZAPINE 20 MG: 5 TABLET, FILM COATED ORAL at 21:43

## 2025-05-16 RX ADMIN — DIPHENHYDRAMINE HYDROCHLORIDE 50 MG: 25 CAPSULE ORAL at 21:43

## 2025-05-16 RX ADMIN — METOPROLOL SUCCINATE 25 MG: 25 TABLET, EXTENDED RELEASE ORAL at 13:08

## 2025-05-16 RX ADMIN — GUAIFENESIN AND DEXTROMETHORPHAN 10 ML: 100; 10 SYRUP ORAL at 14:57

## 2025-05-16 RX ADMIN — GUAIFENESIN AND DEXTROMETHORPHAN 10 ML: 100; 10 SYRUP ORAL at 09:35

## 2025-05-16 RX ADMIN — LOPERAMIDE HYDROCHLORIDE 2 MG: 2 CAPSULE ORAL at 13:18

## 2025-05-16 RX ADMIN — AMLODIPINE BESYLATE 5 MG: 5 TABLET ORAL at 10:38

## 2025-05-16 RX ADMIN — ALLOPURINOL 300 MG: 300 TABLET ORAL at 17:38

## 2025-05-16 ASSESSMENT — ACTIVITIES OF DAILY LIVING (ADL)
ADLS_ACUITY_SCORE: 57
ADLS_ACUITY_SCORE: 44
ADLS_ACUITY_SCORE: 44
ADLS_ACUITY_SCORE: 62
ADLS_ACUITY_SCORE: 44
ADLS_ACUITY_SCORE: 58
ADLS_ACUITY_SCORE: 42
ADLS_ACUITY_SCORE: 58
ADLS_ACUITY_SCORE: 58
ADLS_ACUITY_SCORE: 62
ADLS_ACUITY_SCORE: 62
ADLS_ACUITY_SCORE: 44
ADLS_ACUITY_SCORE: 62
ADLS_ACUITY_SCORE: 44
ADLS_ACUITY_SCORE: 42
ADLS_ACUITY_SCORE: 44
ADLS_ACUITY_SCORE: 57
ADLS_ACUITY_SCORE: 57
ADLS_ACUITY_SCORE: 62
ADLS_ACUITY_SCORE: 44
ADLS_ACUITY_SCORE: 42
ADLS_ACUITY_SCORE: 42
ADLS_ACUITY_SCORE: 44

## 2025-05-16 NOTE — PLAN OF CARE
Goal Outcome Evaluation:      Plan of Care Reviewed With: patient        A&O x 4. Up A-1. VSS, O2 stable on RA. Occasionally hypertensive, medications adjusted per cardiology. Denies pain. Frequent cough, prn robitussin given. Tolerating current diet, good appetite. Diarrhea x 2 this shift, prn loperamide given, no subsequent loose stools. Pacemaker site WDL, dressing CDI. Plan of care ongoing, possible discharge 5/17.

## 2025-05-16 NOTE — PROGRESS NOTES
[facility]    Medicine Progress Note - Hospitalist Service    Date of Admission:  5/15/2025    Assessment & Plan   Evelyn Roa is a markedly pleasant 64 year old woman with past medical history that is most notable for hypertension, gout, and Bipolar 1 Disorder, among others; who presents with cough, exertional dyspnea, and syncope, and is found to have high grade AV block and elevated Troponin, as well as MURRAY.           Complete heart block s/p dual chamber pacemaker placement 5/15  Elevated Troponin - suspected type II NSTEMI  Of note, Ms. Roa has never smoked. She has hypertension. She denies previous history of CAD, CHF, stroke, or diabetes.   *5/15/25 she presents for evaluation of cough, exertional dyspnea, and syncope. In the ED, she has accelerated hypertension, and sever bradycardia as low as 25. She is afebrile, without hypoxia at rest. She has acute leukocytosis to 13.1. Lactate is normal. She has MURRAY and acute metabolic acidosis, as discussed below. TSH is 7.67, FT4 normal at 0.97. Pro BNP is elevated at 4993. Troponin T levels are 130 and then 247. EKG shows sinus bradycardia with rate 32, A-V dissociation, and idioventricular escape beats. Valproic acid level is normal at 55.9  *CXR without PTX, with small effusions mentioned  - ok to discontinue IMC 5/16  - tele  - trops - up to 373 thus far (denies chest pain or pressure)  - Cardiology/EP following - pacemaker placed 5/15  - IV lasix 20mg x 1 per cardiology  - toprol xl initiated per cardiology  - EP deferred ischemic work up to general cardiology - would need to wait at least 6 weeks from implant if cardiac MRI needed  - EP signed off 5/16  - monitor overnight, await plan from cardiology and follow creat     Hyperkalemia  MURRAY and acute AG metabolic acidosis  *adm CO2 is 13, Cr 2.37. Cr was 1.4 in 6/2024. Suspect due to modest hypovolemia, at least in part; vs cardiorenal syndrome. IVF was given in the ED.   - Repeat  "BMP 05/15/25 showing resolution of AG, HCO now 20  - creat 2.0 <--2.1 <--- 2.3  - hold ACE-I   - follow BMP     Accelerated hypertension  - Home Lisinopril held for now for MURRAY. PRN IV Hydralazine ordered     Bipolar 1 Disorder: In remission  - On Depakote, Zyprexa, and Seroquel - continued     Gout  - Resume home Allopurinol      GERD  - Resume Pepcid      Glaucoma  - Resume home eye drops           Diet: Regular Diet Adult    DVT Prophylaxis: Pneumatic Compression Devices  Finley Catheter: Not present  Lines: None     Cardiac Monitoring: ACTIVE order. Indication: Post- EP procedure (48 hours)  Code Status: Full Code      Clinically Significant Risk Factors        # Hyperkalemia: Highest K = 5.4 mmol/L in last 2 days, will monitor as appropriate   # Hyperchloremia: Highest Cl = 114 mmol/L in last 2 days, will monitor as appropriate           # Coagulation Defect: INR = 1.19 (Ref range: 0.85 - 1.15) and/or PTT = N/A, will monitor for bleeding               # Obesity: Estimated body mass index is 38.31 kg/m  as calculated from the following:    Height as of this encounter: 1.638 m (5' 4.5\").    Weight as of this encounter: 102.8 kg (226 lb 11.2 oz)., PRESENT ON ADMISSION      # Pacemaker present       Social Drivers of Health    Tobacco Use: Unknown (5/15/2025)    Patient History     Smoking Tobacco Use: Never     Smokeless Tobacco Use: Unknown   Financial Resource Strain: Medium Risk (1/9/2024)    Received from [facility]    Financial Resource Strain     Difficulty of Paying Living Expenses: 2     Difficulty of Paying Living Expenses: 1          Disposition Plan     Medically Ready for Discharge: Anticipated Tomorrow pending clinical course and creat             Shaggy Andrade MD  Hospitalist Service  [facility]  Securely message with [email or web address] (more info)  Text page via [employer] Paging/Directory "   ______________________________________________________________________    Interval History   Seen and examined late morning.  Feels less lightheaded in general.  No chest pain or pressure.  Denies fevers or chills.  Continue to monitor in general and received IV Lasix for concern of small pleural effusions. Monitor overnight, follow creat.  Discussed with RN.    Physical Exam   Vital Signs: Temp: 98.4  F (36.9  C) Temp src: Oral BP: (!) 144/75 Pulse: 101   Resp: 16 SpO2: (!) 91 % O2 Device: None (Room air) Oxygen Delivery: 2 LPM  Weight: 226 lbs 11.2 oz    Gen: NAD, pleasant  HEENT: EOMI, MMM  Resp: no focal crackles,  no wheezes, no increased work of resp  CV: S1S2 heard, reg rhythm, mildly tachy rate  Abdo: soft, nontender, nondistended, bowel sounds present  Ext: calves nontender, well perfused  Neuro: aa, conversant, moving ext, CN grossly intact, no facial asymmetry      Medical Decision Making       38 MINUTES SPENT BY ME on the date of service doing chart review, history, exam, documentation & further activities per the note.      Data     I have personally reviewed the following data over the past 24 hrs:    13.6 (H)  \   11.2 (L)   / 183     143 114 (H) 27.1 (H) /  143 (H)   5.2 20 (L) 2.01 (H) \     INR:  1.19 (H) PTT:  N/A   D-dimer:  N/A Fibrinogen:  N/A

## 2025-05-16 NOTE — PROGRESS NOTES
Post Device Implant Instructions   5/15/2025 PPM Implant    Dressing:  Clean, dry, and intact  Chest XR reviewed:  Yes  Pneumo present?:  No  Lead Measurements per Device Rep:  WNL    Education Provided:  - Avoid raising left arm above the level of the shoulder for 2 weeks.(until 5/30)  - No lifting >10 pounds for the first week   - For strenuous sports such as tennis, pickle ball, basketball, golf, or weight lifting wait 4 weeks to ensure stable lead healing.   - If there is a pressure dressing in place, this can be removed after 24 hours.   - Leave Aquacel dressing in place.  Okay to shower the day after the procedure.  If this begins to peel up, contact the device clinic.    - Aquacel dressing and steri-strips will be removed at 1 week device check, as appropriate  - Limit driving for: 1 week (PPM)  - Watch for redness, drainage, warmth, or fever. Call device clinic if any signs of infection.   - No soaking in bath tub, swimming pool or hot tub until you are cleared at your 6-8 week check  - Call Device Clinic with increased swelling, drainage, or fever > 101 degrees.     - Follow-up with the Device Clinic as scheduled. 5/27/2025 @ 9:00  Zena    Pt verbalized understanding of instructions and AVS updated.       Reviewed above with pt but patient kept falling asleep.. Spoke with bedside nurse who will review PPM discharge instructions with her at discharge.     Randall JOHNSON

## 2025-05-16 NOTE — PROGRESS NOTES
"  [facility] EP Progress Note  Date of Service: 05/16/2025    Primary Cardiologist: Will be Dr. Mario Rivas MAYA Roa is a 64 year old female with PMH including HTN, subclinical hypothyroidism, CKD (baseline Cr 1.1) who was admitted on 5/15/2025 for 10 days of exertional dizziness/dyspnea (as well a cough), found to have complete AV block and has additionally been observed to have multiple runs of non-sustained VT/Torsades, likely related to bradycardia. Temporary ppm placed, then permanent dual chamber PPM placed without complication on 5/15/25. Echo benign.    Assessment:  Complete heart block, s/p dual chamber Medtronic pacemaker placement. CXR shows good lead placement, no PTX, but evidence of mild congestive HF. Reports mild dyspnea. Device interrogation normal fn. Site healing well, no hematoma.  Denies recent hiking, tick bites. Grandfather had PPM when elderly, otherwise FHx unremarkable.  NSVT/Torsades, presumed bradycardia-mediated. No ventricular arrhythmias noted overnight.  Troponin rise, suspected type II, less likely CAD/myocarditis (mildly elevated WBC). Echo benign.  MURRAY with IVF given in ED, now with evidence of mild HFpEF as above. Creat improving, now 2. PTA lisinopril held.  Possible atrial tach noted on tele ('s persistently, then \"breaks\" to 60's this AM).     Plan:   IV Lasix 20 mg x 1.  Start BB (Toprol XL 25 mg daily). Will monitor for arrhythmias on device.   Consideration for ischemic eval per general cardiology team. Would need to wait at least 6 weeks from implant if cardiac MRI is needed.  Device RN will perform education/arrange follow up. EP will sign off.    Plan was formulated under direction of Dr. Palacios.   Arely Fernandez, ABEL  [facility]  Vocera page  __________________________________________________________________________  Physical Exam   Temp: 98.4  F (36.9  C) Temp src: Oral BP: (!) 144/75 Pulse: 101   Resp: 16 SpO2: " (!) 91 % O2 Device: None (Room air) Oxygen Delivery: 2 LPM  Vitals:    05/15/25 0300 05/16/25 0414   Weight: 104 kg (229 lb 4.5 oz) 102.8 kg (226 lb 11.2 oz)       GENERAL:  The patient is in no apparent distress.   NECK: CVP appears normal, no masses or thyromegaly.  PULMONARY:  There is a normal respiratory effort. Clear lungs to auscultation bilaterally.   CARDIOVASCULAR:  RRR, normal S1 S2, no m/r/g. L chest pacemaker site covered, without evidence of hematoma.  EXTREMITIES:  No clubbing, cyanosis or edema.    Medications   Current Facility-Administered Medications   Medication Dose Route Frequency Provider Last Rate Last Admin     Current Facility-Administered Medications   Medication Dose Route Frequency Provider Last Rate Last Admin    allopurinol (ZYLOPRIM) tablet 300 mg  300 mg Oral Daily with supper Vernon Neves MD   300 mg at 05/15/25 1757    diphenhydrAMINE (BENADRYL) capsule 50 mg  50 mg Oral At Bedtime Vernon Neves MD   50 mg at 05/15/25 2119    divalproex sodium extended-release (DEPAKOTE ER) 24 hr tablet 1,000 mg  1,000 mg Oral Every Other Day Vernon Neves MD        divalproex sodium extended-release (DEPAKOTE ER) 24 hr tablet 1,500 mg  1,500 mg Oral Every Other Day Vernon Neves MD   1,500 mg at 05/15/25 2119    latanoprost (XALATAN) 0.005 % ophthalmic solution 1 drop  1 drop Both Eyes At Bedtime Shaggy Andrade MD   1 drop at 05/15/25 2112    [Held by provider] lisinopril (ZESTRIL) tablet 20 mg  20 mg Oral Daily Vernon Neves MD        OLANZapine (zyPREXA) tablet 20 mg  20 mg Oral At Bedtime Vernon Neves MD   20 mg at 05/15/25 2119    QUEtiapine (SEROquel) tablet 100-200 mg  100-200 mg Oral At Bedtime Vernon Neves MD   100 mg at 05/15/25 2119    sodium chloride (PF) 0.9% PF flush 3 mL  3 mL Intracatheter Q8H Vernon Neves MD   3 mL at 05/15/25 1757    sodium chloride (PF) 0.9% PF flush 3 mL  3 mL Intracatheter Q8H  Vernon Vasquez MD   3 mL at 05/15/25 2122       Data   Most Recent 3 CBC's:  Recent Labs   Lab Test 05/16/25  0544 05/15/25  1529 05/15/25  0428   WBC 13.6* 10.8 11.2*   HGB 11.2* 11.3* 11.1*   MCV 95 95 96    220 224     Most Recent 3 BMP's:  Recent Labs   Lab Test 05/16/25  0544 05/15/25  0428 05/15/25  0148 05/14/25  1944    143  --  141   POTASSIUM 5.2 5.4*  --  5.2   CHLORIDE 114* 113*  --  112*   CO2 20* 20*  --  13*   BUN 27.1* 37.7*  --  43.2*   CR 2.01* 2.13*  --  2.37*   ANIONGAP 9 10  --  16*   JAMES 9.3 9.4  --  9.4   * 116* 121* 143*

## 2025-05-16 NOTE — PROGRESS NOTES
Brief Cardiology Chart Check:    S/p successful dual chamber PPM placement on 5/15/25. EP has signed off.    Plan:  Creatinine still elevated at 2.01, likely in the setting of prolonged heart block. Recommend continuing to hold PTA lisinopril 20 mg.   Start amlodipine 5 mg daily for hypertension.   Considering no anginal symptoms, can proceed with outpatient Lexiscan stress test in 1 month for ischemic evaluation  Recommend cardiac MRI in 6 weeks  Outpatient cardiology follow up to be arranged following testing in 2 months

## 2025-05-16 NOTE — PLAN OF CARE
A&OX4, RA, hypertensive no PRN given. Robitussin given, up with assist of 1, on regular diet. Purewick used with good urine output. Pacemaker site WNL.

## 2025-05-16 NOTE — CONSULTS
No further care management intervention anticipated at this time.  SW met with patient to ask about possible needs at home. Patient states she does not anticipate any. Please re-consult if further needs arise.  Care management signing off.       Odalis Livingston [facility]  [phone]

## 2025-05-16 NOTE — DISCHARGE INSTRUCTIONS
Discharge Instructions for Pacemaker Implantation    You have had a procedure to insert a pacemaker. Once inside your body, this small electronic device helps keep your heart from beating too slowly. A pacemaker can t fix existing heart problems. But it can help you feel better and have more energy. As you recover, follow all of the instructions you are given, including those below.    Activity  Follow the instructions you are given about limiting your activity.  Do not raise your affected arm above shoulder level for 2 weeks (until 5/30). This gives the device lead wires time to attach securely inside your heart.  No lifting greater than 10 pounds for the first week.  A gallon of milk weighs about 8 pounds.    Refrain from strenuous sports such as tennis, pickle ball, basketball, golf, or weight lifting for 4 weeks to ensure stable lead healing.   Ask your doctor when you can expect to return to work.  You can still exercise. It s good for your body and your heart. Talk with your doctor about an exercise plan.  No driving for 7 days    Other Precautions  Follow your doctor's directions carefully for wound care:   If you have a pressure dressing in place (large amount of gauze with a stretchy bandage over it that goes across your whole chest), this can be removed 24 hours after your procedure.  Do not remove the Aquacel dressing (tan, rectangular, rubbery looking bandage) that is underneath it.   Leave the Aquacel dressing in place until your 1 week device check.  This will be removed by the device nurse at that time, as appropriate.  The Aquacel dressing is water proof, you may shower with it on starting the morning after your procedure. If the edges of the dressing are peeling off, do NOT shower and contact your clinic for further instruction at [phone].    No soaking in bath tub, swimming pool or hot tub until you are cleared at your 6-8 week check  Never put any creams, lotions, powders, or products like  peroxide on your incision unless your doctor tells you to.    Before you receive any treatment, tell all health care providers (including your dentist) that you have a pacemaker.  You will be given an ID card that contains information about your pacemaker. Always carry this card with you. You can show this card if your pacemaker sets off a metal detector. You should also show it to avoid screening with a hand-held security wand.  Keep your cell phone away from your pacemaker. Don t carry the phone in your shirt pocket, even when it s turned off.  Avoid strong magnets. Examples are those used in MRIs or in hand-held security wands.  Avoid strong electrical fields. Examples are those made by radio transmitting towers,  ham  radios, and heavy-duty electrical equipment.  Avoid leaning over the open alatorre of a running car. A running engine creates an electrical field. Most household and yard appliances will not cause any problems. If you use any large power tools, such as an industrial , talk with your doctor.     Follow-Up  Follow up in the device clinic as scheduled.   You have an appointment scheduled on 5/27/2025 @ 0900 at  [facility] in [address], [address], [place] [address]  Park in [facility] (west of [facility]), walk across the skway, stay on the 2nd floor, heart clinic is to the left of the large staircase just past the elevators     Make regular follow-up appointments with your device clinic. They will check the pacemaker to make sure it s working properly.    When to Call Your Device Clinic [phone]  Call your Device Clinic if you have any of the following:  Dizziness  Chest pain  Lack of energy  Fainting spells  Rapid pulse or pounding heartbeat  Shortness of breath  Increased pain around your pacemaker  Fever above 100.4 F (38 C) or other signs of infection (redness, swelling, drainage, or warmth at the incision site)     2871-1014 The  [employer] [address] [place] [address], [place] [address]. All rights reserved. This information is not intended as a substitute for professional medical care. Always follow your healthcare professional's instructions.    [facility] in [place]: [phone]  Device Clinic (Monday to Friday, 8am-4pm): [phone]  *The device clinic is closed on weekends and holidays.  Any calls received during this time will be answered on the next business  day. For any urgent questions after hours, please call the main clinic number and you will be put in contact with the cardiologist on call.

## 2025-05-17 LAB
ANION GAP SERPL CALCULATED.3IONS-SCNC: 12 MMOL/L (ref 7–15)
ATRIAL RATE - MUSE: 93 BPM
BUN SERPL-MCNC: 30.2 MG/DL (ref 8–23)
CALCIUM SERPL-MCNC: 9.2 MG/DL (ref 8.8–10.4)
CHLORIDE SERPL-SCNC: 107 MMOL/L (ref 98–107)
CREAT SERPL-MCNC: 1.85 MG/DL (ref 0.51–0.95)
DIASTOLIC BLOOD PRESSURE - MUSE: NORMAL MMHG
EGFRCR SERPLBLD CKD-EPI 2021: 30 ML/MIN/1.73M2
GLUCOSE SERPL-MCNC: 120 MG/DL (ref 70–99)
HCO3 SERPL-SCNC: 20 MMOL/L (ref 22–29)
INTERPRETATION ECG - MUSE: NORMAL
P AXIS - MUSE: 46 DEGREES
POTASSIUM SERPL-SCNC: 4.8 MMOL/L (ref 3.4–5.3)
PR INTERVAL - MUSE: 198 MS
QRS DURATION - MUSE: 138 MS
QT - MUSE: 426 MS
QTC - MUSE: 529 MS
R AXIS - MUSE: 110 DEGREES
SODIUM SERPL-SCNC: 139 MMOL/L (ref 135–145)
SYSTOLIC BLOOD PRESSURE - MUSE: NORMAL MMHG
T AXIS - MUSE: -55 DEGREES
VENTRICULAR RATE- MUSE: 93 BPM

## 2025-05-17 PROCEDURE — 85014 HEMATOCRIT: CPT | Performed by: HOSPITALIST

## 2025-05-17 PROCEDURE — 250N000013 HC RX MED GY IP 250 OP 250 PS 637: Performed by: PHYSICIAN ASSISTANT

## 2025-05-17 PROCEDURE — 210N000002 HC R&B HEART CARE

## 2025-05-17 PROCEDURE — 36415 COLL VENOUS BLD VENIPUNCTURE: CPT | Performed by: HOSPITALIST

## 2025-05-17 PROCEDURE — 80051 ELECTROLYTE PANEL: CPT | Performed by: HOSPITALIST

## 2025-05-17 PROCEDURE — 99232 SBSQ HOSP IP/OBS MODERATE 35: CPT | Performed by: HOSPITALIST

## 2025-05-17 PROCEDURE — 250N000013 HC RX MED GY IP 250 OP 250 PS 637: Performed by: HOSPITALIST

## 2025-05-17 PROCEDURE — 250N000013 HC RX MED GY IP 250 OP 250 PS 637: Performed by: INTERNAL MEDICINE

## 2025-05-17 RX ORDER — BENZONATATE 100 MG/1
100 CAPSULE ORAL 3 TIMES DAILY PRN
Status: DISCONTINUED | OUTPATIENT
Start: 2025-05-17 | End: 2025-05-19 | Stop reason: HOSPADM

## 2025-05-17 RX ADMIN — LATANOPROST 1 DROP: 50 SOLUTION OPHTHALMIC at 22:32

## 2025-05-17 RX ADMIN — DIVALPROEX SODIUM 1500 MG: 500 TABLET, FILM COATED, EXTENDED RELEASE ORAL at 20:14

## 2025-05-17 RX ADMIN — DIPHENHYDRAMINE HYDROCHLORIDE 50 MG: 25 CAPSULE ORAL at 22:25

## 2025-05-17 RX ADMIN — METOPROLOL SUCCINATE 25 MG: 25 TABLET, EXTENDED RELEASE ORAL at 08:54

## 2025-05-17 RX ADMIN — BENZONATATE 100 MG: 100 CAPSULE ORAL at 14:30

## 2025-05-17 RX ADMIN — AMLODIPINE BESYLATE 5 MG: 5 TABLET ORAL at 08:54

## 2025-05-17 RX ADMIN — GUAIFENESIN AND DEXTROMETHORPHAN 10 ML: 100; 10 SYRUP ORAL at 01:34

## 2025-05-17 RX ADMIN — ALLOPURINOL 300 MG: 300 TABLET ORAL at 16:26

## 2025-05-17 RX ADMIN — Medication 1 LOZENGE: at 01:34

## 2025-05-17 RX ADMIN — Medication 1 LOZENGE: at 20:13

## 2025-05-17 RX ADMIN — GUAIFENESIN AND DEXTROMETHORPHAN 10 ML: 100; 10 SYRUP ORAL at 05:33

## 2025-05-17 RX ADMIN — OLANZAPINE 20 MG: 5 TABLET, FILM COATED ORAL at 22:26

## 2025-05-17 RX ADMIN — BENZONATATE 100 MG: 100 CAPSULE ORAL at 20:14

## 2025-05-17 RX ADMIN — GUAIFENESIN AND DEXTROMETHORPHAN 10 ML: 100; 10 SYRUP ORAL at 20:13

## 2025-05-17 RX ADMIN — QUETIAPINE FUMARATE 200 MG: 25 TABLET ORAL at 22:26

## 2025-05-17 RX ADMIN — ACETAMINOPHEN 650 MG: 325 TABLET, FILM COATED ORAL at 14:30

## 2025-05-17 ASSESSMENT — ACTIVITIES OF DAILY LIVING (ADL)
ADLS_ACUITY_SCORE: 35
ADLS_ACUITY_SCORE: 57
ADLS_ACUITY_SCORE: 35
ADLS_ACUITY_SCORE: 32
ADLS_ACUITY_SCORE: 57
ADLS_ACUITY_SCORE: 32
ADLS_ACUITY_SCORE: 57
ADLS_ACUITY_SCORE: 32
ADLS_ACUITY_SCORE: 34
ADLS_ACUITY_SCORE: 32
ADLS_ACUITY_SCORE: 35
ADLS_ACUITY_SCORE: 35
ADLS_ACUITY_SCORE: 57
ADLS_ACUITY_SCORE: 35
ADLS_ACUITY_SCORE: 57
ADLS_ACUITY_SCORE: 32
ADLS_ACUITY_SCORE: 57
ADLS_ACUITY_SCORE: 32
ADLS_ACUITY_SCORE: 57
ADLS_ACUITY_SCORE: 57

## 2025-05-17 NOTE — PROGRESS NOTES
[facility]    Medicine Progress Note - Hospitalist Service    Date of Admission:  5/15/2025    Assessment & Plan   Evelyn Roa is a markedly pleasant 64 year old woman with past medical history that is most notable for hypertension, gout, and Bipolar 1 Disorder, among others; who presents with cough, exertional dyspnea, and syncope, and is found to have high grade AV block and elevated Troponin, as well as MURRAY.           Complete heart block s/p dual chamber pacemaker placement 5/15  Elevated Troponin - suspected type II NSTEMI  Of note, Ms. Roa has never smoked. She has hypertension. She denies previous history of CAD, CHF, stroke, or diabetes.   *5/15/25 she presents for evaluation of cough, exertional dyspnea, and syncope. In the ED, she has accelerated hypertension, and sever bradycardia as low as 25. She is afebrile, without hypoxia at rest. She has acute leukocytosis to 13.1. Lactate is normal. She has MURRAY and acute metabolic acidosis, as discussed below. TSH is 7.67, FT4 normal at 0.97. Pro BNP is elevated at 4993. Troponin T levels are 130 and then 247. EKG shows sinus bradycardia with rate 32, A-V dissociation, and idioventricular escape beats. Valproic acid level is normal at 55.9  *CXR without PTX, with small effusions mentioned  - ok to discontinue IMC 5/16  - tele  - trops - up to 373 (denies chest pain or pressure)  - Cardiology/EP following - pacemaker placed 5/15  - IV lasix 20mg x 1 per cardiology 5/16  - toprol xl initiated per cardiology  - EP deferred ischemic work up to general cardiology - would need to wait at least 6 weeks from implant if cardiac MRI needed  - EP signed off 5/16  - follow creat and breathing, low threshold to obtain non contrast CT chest, ongoing cough, no fever/chills  Per cardiology 5/15: Plan:  Creatinine still elevated at 2.01, likely in the setting of prolonged heart block. Recommend continuing to hold PTA lisinopril 20 mg.   Start  "amlodipine 5 mg daily for hypertension.   Considering no anginal symptoms, can proceed with outpatient Lexiscan stress test in 1 month for ischemic evaluation  Recommend cardiac MRI in 6 weeks  Outpatient cardiology follow up to be arranged following testing in 2 months       Hyperkalemia  MURRAY and acute AG metabolic acidosis  *adm CO2 is 13, Cr 2.37. Cr was 1.4 in 6/2024. Suspect due to modest hypovolemia, at least in part; vs cardiorenal syndrome. IVF was given in the ED.   - Repeat BMP 05/15/25 showing resolution of AG, HCO now 20  - creat 1.8 <0-- 2.0 <--2.1 <--- 2.3  - hold ACE-I   - follow BMP     Accelerated hypertension  - Home Lisinopril held for now for MURRAY. PRN IV Hydralazine ordered     Bipolar 1 Disorder: In remission  - On Depakote, Zyprexa, and Seroquel - continued     Gout  - Resume home Allopurinol      GERD  - Resume Pepcid      Glaucoma  - Resume home eye drops           Diet: Regular Diet Adult    DVT Prophylaxis: Pneumatic Compression Devices  Finley Catheter: Not present  Lines: None     Cardiac Monitoring: ACTIVE order. Indication: Post- EP procedure (48 hours)  Code Status: Full Code      Clinically Significant Risk Factors          # Hyperchloremia: Highest Cl = 114 mmol/L in last 2 days, will monitor as appropriate           # Coagulation Defect: INR = 1.19 (Ref range: 0.85 - 1.15) and/or PTT = N/A, will monitor for bleeding               # Obesity: Estimated body mass index is 38.31 kg/m  as calculated from the following:    Height as of this encounter: 1.638 m (5' 4.5\").    Weight as of this encounter: 102.8 kg (226 lb 11.2 oz)., PRESENT ON ADMISSION      # Pacemaker present       Social Drivers of Health    Tobacco Use: Unknown (5/15/2025)    Patient History     Smoking Tobacco Use: Never     Smokeless Tobacco Use: Unknown          Disposition Plan     Medically Ready for Discharge: Anticipated Tomorrow pending O2 need, creat             Shaggy Andrade MD  Hospitalist Service  [facility]" [facility]  Securely message with [employer] (more info)  Text page via [employer] Paging/Directory   ______________________________________________________________________    Interval History   Patient seen and examined.  Overall nothing new.  She continues to have cough which she has had for 3 to 4 weeks.  No fevers or chills.  Discussed/reviewed that we do not have evidence to support pneumonia at this point.  Creatinine slightly improved at 1.8-will continue to watch that and O2 need and consider discharge 5/18.    Physical Exam   Vital Signs: Temp: 97.9  F (36.6  C) Temp src: Oral BP: (!) 143/57 Pulse: 78   Resp: 20 SpO2: 95 % O2 Device: Nasal cannula Oxygen Delivery: 1 LPM  Weight: 226 lbs 11.2 oz    Gen: NAD, pleasant  HEENT: EOMI, MMM, NC in place  Resp: no focal crackles,  no wheezes, no increased work of resp  CV: S1S2 heard, reg rhythm, reg rate  Abdo: soft, nontender, nondistended, bowel sounds present  Ext: calves nontender, well perfused  Neuro:  aa, conversant, moving ext, CN grossly intact, no facial asymmetry      Medical Decision Making       41 MINUTES SPENT BY ME on the date of service doing chart review, history, exam, documentation & further activities per the note.      Data     I have personally reviewed the following data over the past 24 hrs:    14.5 (H)  \   11.7   / 146 (L)     139 107 30.2 (H) /  120 (H)   4.8 20 (L) 1.85 (H) \

## 2025-05-17 NOTE — PLAN OF CARE
[facility] Shift Note    Orientation: A/O x 4  Vitals: VSS on 2L overnight. Frequent cough, dry/non productive. PRN robitussin given x3.   Tele: V- paced  Lines/Drains: SL  Skin/Wounds: Pacemaker site WDL, CMS in tact.   GI/: WDL.External cath in place.  Ambulation/Assist: Up w/ 1  Plan: Possible discharge today.     Amy Lee RN

## 2025-05-17 NOTE — PLAN OF CARE
Goal Outcome Evaluation:      Plan of Care Reviewed With: patient, parent        A&O x 4. Up A-1, ambulating in hallways throughout shift. VSS, O2 stable on RA, 1L NC with sleep. Occasionally hypertensive. Denies pain. Frequent cough, prn robitussin and tessalon given. Tolerating current diet, good appetite. Pacemaker site WDL, dressing CDI. Plan of care ongoing, possible discharge 5/18 pending AM labs.

## 2025-05-18 ENCOUNTER — APPOINTMENT (OUTPATIENT)
Dept: CT IMAGING | Facility: CLINIC | Age: 65
End: 2025-05-18
Attending: HOSPITALIST
Payer: COMMERCIAL

## 2025-05-18 LAB
ANION GAP SERPL CALCULATED.3IONS-SCNC: 12 MMOL/L (ref 7–15)
ANION GAP SERPL CALCULATED.3IONS-SCNC: 13 MMOL/L (ref 7–15)
BASOPHILS # BLD AUTO: 0.1 10E3/UL (ref 0–0.2)
BASOPHILS NFR BLD AUTO: 1 %
BUN SERPL-MCNC: 35.6 MG/DL (ref 8–23)
BUN SERPL-MCNC: 36.1 MG/DL (ref 8–23)
CALCIUM SERPL-MCNC: 9.3 MG/DL (ref 8.8–10.4)
CALCIUM SERPL-MCNC: 9.9 MG/DL (ref 8.8–10.4)
CHLORIDE SERPL-SCNC: 106 MMOL/L (ref 98–107)
CHLORIDE SERPL-SCNC: 110 MMOL/L (ref 98–107)
CREAT SERPL-MCNC: 1.71 MG/DL (ref 0.51–0.95)
CREAT SERPL-MCNC: 1.77 MG/DL (ref 0.51–0.95)
CRP SERPL-MCNC: 46.22 MG/L
EGFRCR SERPLBLD CKD-EPI 2021: 32 ML/MIN/1.73M2
EGFRCR SERPLBLD CKD-EPI 2021: 33 ML/MIN/1.73M2
EOSINOPHIL # BLD AUTO: 0.6 10E3/UL (ref 0–0.7)
EOSINOPHIL NFR BLD AUTO: 4 %
ERYTHROCYTE [DISTWIDTH] IN BLOOD BY AUTOMATED COUNT: 15.8 % (ref 10–15)
FRAGMENTS BLD QL SMEAR: ABNORMAL
GLUCOSE SERPL-MCNC: 109 MG/DL (ref 70–99)
GLUCOSE SERPL-MCNC: 140 MG/DL (ref 70–99)
HCO3 SERPL-SCNC: 15 MMOL/L (ref 22–29)
HCO3 SERPL-SCNC: 21 MMOL/L (ref 22–29)
HCT VFR BLD AUTO: 40.7 % (ref 35–47)
HGB BLD-MCNC: 13 G/DL (ref 11.7–15.7)
IMM GRANULOCYTES # BLD: 0.1 10E3/UL
IMM GRANULOCYTES NFR BLD: 1 %
LYMPHOCYTES # BLD AUTO: 5.3 10E3/UL (ref 0.8–5.3)
LYMPHOCYTES NFR BLD AUTO: 35 %
MCH RBC QN AUTO: 30.9 PG (ref 26.5–33)
MCHC RBC AUTO-ENTMCNC: 31.9 G/DL (ref 31.5–36.5)
MCV RBC AUTO: 97 FL (ref 78–100)
MONOCYTES # BLD AUTO: 1.2 10E3/UL (ref 0–1.3)
MONOCYTES NFR BLD AUTO: 8 %
NEUTROPHILS # BLD AUTO: 8 10E3/UL (ref 1.6–8.3)
NEUTROPHILS NFR BLD AUTO: 52 %
NRBC # BLD AUTO: 0 10E3/UL
NRBC BLD AUTO-RTO: 0 /100
NT-PROBNP SERPL-MCNC: 6413 PG/ML (ref 0–226)
PLAT MORPH BLD: ABNORMAL
PLATELET # BLD AUTO: 149 10E3/UL (ref 150–450)
POTASSIUM SERPL-SCNC: 4.9 MMOL/L (ref 3.4–5.3)
POTASSIUM SERPL-SCNC: 5.4 MMOL/L (ref 3.4–5.3)
PROCALCITONIN SERPL IA-MCNC: 0.14 NG/ML
RBC # BLD AUTO: 4.21 10E6/UL (ref 3.8–5.2)
RBC MORPH BLD: ABNORMAL
SODIUM SERPL-SCNC: 137 MMOL/L (ref 135–145)
SODIUM SERPL-SCNC: 140 MMOL/L (ref 135–145)
VARIANT LYMPHS BLD QL SMEAR: PRESENT
WBC # BLD AUTO: 15.2 10E3/UL (ref 4–11)

## 2025-05-18 PROCEDURE — 87798 DETECT AGENT NOS DNA AMP: CPT | Performed by: HOSPITALIST

## 2025-05-18 PROCEDURE — 85014 HEMATOCRIT: CPT | Performed by: HOSPITALIST

## 2025-05-18 PROCEDURE — 82374 ASSAY BLOOD CARBON DIOXIDE: CPT | Performed by: HOSPITALIST

## 2025-05-18 PROCEDURE — 86140 C-REACTIVE PROTEIN: CPT | Performed by: HOSPITALIST

## 2025-05-18 PROCEDURE — 86618 LYME DISEASE ANTIBODY: CPT | Performed by: HOSPITALIST

## 2025-05-18 PROCEDURE — 99233 SBSQ HOSP IP/OBS HIGH 50: CPT | Performed by: HOSPITALIST

## 2025-05-18 PROCEDURE — 36415 COLL VENOUS BLD VENIPUNCTURE: CPT | Performed by: HOSPITALIST

## 2025-05-18 PROCEDURE — 210N000002 HC R&B HEART CARE

## 2025-05-18 PROCEDURE — 83880 ASSAY OF NATRIURETIC PEPTIDE: CPT | Performed by: HOSPITALIST

## 2025-05-18 PROCEDURE — 71250 CT THORAX DX C-: CPT

## 2025-05-18 PROCEDURE — 84145 PROCALCITONIN (PCT): CPT | Performed by: HOSPITALIST

## 2025-05-18 PROCEDURE — 250N000013 HC RX MED GY IP 250 OP 250 PS 637: Performed by: PHYSICIAN ASSISTANT

## 2025-05-18 PROCEDURE — 250N000013 HC RX MED GY IP 250 OP 250 PS 637: Performed by: HOSPITALIST

## 2025-05-18 PROCEDURE — 250N000013 HC RX MED GY IP 250 OP 250 PS 637: Performed by: INTERNAL MEDICINE

## 2025-05-18 RX ADMIN — QUETIAPINE FUMARATE 200 MG: 25 TABLET ORAL at 21:35

## 2025-05-18 RX ADMIN — DIVALPROEX SODIUM 1000 MG: 500 TABLET, FILM COATED, EXTENDED RELEASE ORAL at 21:35

## 2025-05-18 RX ADMIN — GUAIFENESIN AND DEXTROMETHORPHAN 10 ML: 100; 10 SYRUP ORAL at 21:35

## 2025-05-18 RX ADMIN — ACETAMINOPHEN 650 MG: 325 TABLET, FILM COATED ORAL at 08:29

## 2025-05-18 RX ADMIN — LATANOPROST 1 DROP: 50 SOLUTION OPHTHALMIC at 21:40

## 2025-05-18 RX ADMIN — BENZONATATE 100 MG: 100 CAPSULE ORAL at 13:47

## 2025-05-18 RX ADMIN — DIPHENHYDRAMINE HYDROCHLORIDE 50 MG: 25 CAPSULE ORAL at 21:39

## 2025-05-18 RX ADMIN — ACETAMINOPHEN 650 MG: 325 TABLET, FILM COATED ORAL at 13:47

## 2025-05-18 RX ADMIN — BENZONATATE 100 MG: 100 CAPSULE ORAL at 08:29

## 2025-05-18 RX ADMIN — OLANZAPINE 20 MG: 5 TABLET, FILM COATED ORAL at 21:40

## 2025-05-18 RX ADMIN — AMLODIPINE BESYLATE 5 MG: 5 TABLET ORAL at 08:30

## 2025-05-18 RX ADMIN — ALLOPURINOL 300 MG: 300 TABLET ORAL at 16:59

## 2025-05-18 RX ADMIN — METOPROLOL SUCCINATE 25 MG: 25 TABLET, EXTENDED RELEASE ORAL at 08:30

## 2025-05-18 RX ADMIN — GUAIFENESIN AND DEXTROMETHORPHAN 10 ML: 100; 10 SYRUP ORAL at 13:47

## 2025-05-18 RX ADMIN — BENZONATATE 100 MG: 100 CAPSULE ORAL at 21:34

## 2025-05-18 ASSESSMENT — ACTIVITIES OF DAILY LIVING (ADL)
ADLS_ACUITY_SCORE: 32
ADLS_ACUITY_SCORE: 43
ADLS_ACUITY_SCORE: 32
ADLS_ACUITY_SCORE: 43
ADLS_ACUITY_SCORE: 32
ADLS_ACUITY_SCORE: 43
ADLS_ACUITY_SCORE: 32
ADLS_ACUITY_SCORE: 43
ADLS_ACUITY_SCORE: 39
ADLS_ACUITY_SCORE: 32
ADLS_ACUITY_SCORE: 43
ADLS_ACUITY_SCORE: 43

## 2025-05-18 NOTE — PLAN OF CARE
Goal Outcome Evaluation:      Plan of Care Reviewed With: patient, child        A&O x 4. Up A-1, ambulating in hallways throughout shift. VSS, O2 stable on RA. Pain reported in left knee and pacemaker insertion site, prn tylenol effective. Frequent cough, prn tessalon given. Incentive spirometer education provided, pt able to demonstrate proper use. Tolerating current diet, good appetite. Pacemaker site WDL, dressing changed. Plan of care ongoing, possible discharge 5/19 pending AM labs. Chest CT done this morning, daughter updated.

## 2025-05-18 NOTE — PLAN OF CARE
[facility] Shift Note    Orientation: A/O x 4  Vitals: VSS on RA  Tele: A- paced  Lines/Drains: SL  Skin/Wounds: L- cheast PMM site WDL, redness/blanchable sacral area.   GI/: WDL. External cath in place.  Ambulation/Assist: SBA  Plan: Possible discharge today. Cardiac MRI in 6 weeks.     Amy Lee RN

## 2025-05-18 NOTE — PROGRESS NOTES
[facility]    Medicine Progress Note - Hospitalist Service    Date of Admission:  5/15/2025    Assessment & Plan   Evelyn Roa is a markedly pleasant 64 year old woman with past medical history that is most notable for hypertension, gout, and Bipolar 1 Disorder, among others; who presents with cough, exertional dyspnea, and syncope, and is found to have high grade AV block and elevated Troponin, as well as MURRAY.           Complete heart block s/p dual chamber pacemaker placement 5/15  Elevated Troponin - suspected type II NSTEMI  Of note, Ms. Roa has never smoked. She has hypertension. She denies previous history of CAD, CHF, stroke, or diabetes.   *5/15/25 she presents for evaluation of cough, exertional dyspnea, and syncope. In the ED, she has accelerated hypertension, and sever bradycardia as low as 25. She is afebrile, without hypoxia at rest. She has acute leukocytosis to 13.1. Lactate is normal. She has MURRAY and acute metabolic acidosis, as discussed below. TSH is 7.67, FT4 normal at 0.97. Pro BNP is elevated at 4993. Troponin T levels are 130 and then 247. EKG shows sinus bradycardia with rate 32, A-V dissociation, and idioventricular escape beats. Valproic acid level is normal at 55.9  *CXR without PTX, with small effusions mentioned  *trops - up to 373 (denies chest pain or pressure)  - Cardiology/EP following - pacemaker placed 5/15 - signed off since  - IV lasix 20mg x 1 per cardiology 5/16  - toprol xl initiated per cardiology  - following creatinine - improving slowly as below  Per cardiology 5/15: Plan:  Creatinine still elevated at 2.01, likely in the setting of prolonged heart block. Recommend continuing to hold PTA lisinopril 20 mg.   Start amlodipine 5 mg daily for hypertension.   Considering no anginal symptoms, can proceed with outpatient Lexiscan stress test in 1 month for ischemic evaluation  Recommend cardiac MRI in 6 weeks  Outpatient cardiology follow up to be  "arranged following testing in 2 months      Cough, leukocytosis  Concern of pneumonia  Cough for 3-4 weeks. No fevers or chills. Previous XR without convincing infiltrate.   *WBC remains elevated ~15k  - clinically looking ok - afebrile and nontoxic thru 5/18 AM  - check CT chest non contrast  - procalc and BNP add on     MURRAY - slowly improving  Acute AG metabolic acidosis   *adm CO2 is 13, Cr 2.37. Cr was 1.4 in 6/2024. Suspect due to modest hypovolemia, at least in part; vs cardiorenal syndrome. IVF was given in the ED.   *Repeat BMP 05/15/25 showing resolution of AG, HCO 20  *creat slowly improving, likely in setting of prolonged heart block and possibly poor perfusion  - creat 1.7 <-- 1.8 <0-- 2.0 <--2.1 <--- 2.3  - hold ACE-I   - follow BMP     Hyperkalemia  Variable, mild. Follow.    Accelerated hypertension  - Home Lisinopril held for now for MURRAY.   - PRN IV Hydralazine ordered  - amlodipine 5mg initiated     Bipolar 1 Disorder: In remission  - On Depakote, Zyprexa, and Seroquel - continued     Gout  - Resume home Allopurinol      GERD  - Resume Pepcid      Glaucoma  - Resume home eye drops           Diet: Regular Diet Adult    DVT Prophylaxis: Pneumatic Compression Devices  Finley Catheter: Not present  Lines: None     Cardiac Monitoring: ACTIVE order. Indication: Post- EP procedure (48 hours)  Code Status: Full Code      Clinically Significant Risk Factors        # Hyperkalemia: Highest K = 5.4 mmol/L in last 2 days, will monitor as appropriate   # Hyperchloremia: Highest Cl = 110 mmol/L in last 2 days, will monitor as appropriate                         # Obesity: Estimated body mass index is 37.26 kg/m  as calculated from the following:    Height as of this encounter: 1.638 m (5' 4.5\").    Weight as of this encounter: 100 kg (220 lb 7.4 oz)., PRESENT ON ADMISSION      # Pacemaker present       Social Drivers of Health    Tobacco Use: Unknown (5/15/2025)    Patient History     Smoking Tobacco Use: Never     " Smokeless Tobacco Use: Unknown          Disposition Plan     Medically Ready for Discharge: Anticipated Tomorrow pending creat and CT chest as above             Shaggy Andrade MD  Hospitalist Service  [facility]  Securely message with [employer] (more info)  Text page via [employer] Paging/Directory   ______________________________________________________________________    Interval History   Patient seen and examined this morning.  Still with ongoing cough which has been going on for the past 3 to 4 weeks.  Denies fevers or chills.  No chest pain or new complaints otherwise.  Discussed that creatinine is slowly improving.  Will check CT chest Noncon.  Discussed with RN.    Physical Exam   Vital Signs: Temp: 97.9  F (36.6  C) Temp src: Oral BP: (!) 161/76 Pulse: 73   Resp: 18 SpO2: 94 % O2 Device: None (Room air)    Weight: 220 lbs 7.36 oz    Gen: NAD, pleasant  HEENT: EOMI, MMM  Resp: no focal crackles,  no wheezes, no increased work of resp  CV: S1S2 heard, reg rhythm, reg rate  Abdo: soft, nontender, nondistended, bowel sounds present  Ext: calves nontender, well perfused  Neuro: aa, conversant, moving ext, CN grossly intact, no facial asymmetry      Medical Decision Making       52 MINUTES SPENT BY ME on the date of service doing chart review, history, exam, documentation & further activities per the note.      Data     I have personally reviewed the following data over the past 24 hrs:    15.2 (H)  \   13.0   / 149 (L)     137 110 (H) 35.6 (H) /  109 (H)   5.4 (H) 15 (L) 1.71 (H) \     Trop: N/A BNP: 6,413 (H)     Procal: 0.14 CRP: N/A Lactic Acid: N/A

## 2025-05-18 NOTE — PLAN OF CARE
Goal Outcome Evaluation:      Plan of Care Reviewed With: patient    Overall Patient Progress: no changeOverall Patient Progress: no change    Neuro: A&Ox4  Tele/Cardiac: Vpaced  Vitals:VSS on RA  Activity: SBA  Pain: Denies pain  Drips/IV: PIV:S/L  GI/: Occasional urine incontinence purewick in place  Skin: L chest PPM site WDL redness blanchable to sacral area  Diet: Regular  Test/Procedures: NA  Plan: Possible discharge tomorrow pending labs. Pt continues with the dry cough PRN given

## 2025-05-19 VITALS
OXYGEN SATURATION: 97 % | TEMPERATURE: 97.3 F | HEART RATE: 97 BPM | WEIGHT: 225.2 LBS | RESPIRATION RATE: 18 BRPM | SYSTOLIC BLOOD PRESSURE: 137 MMHG | HEIGHT: 65 IN | DIASTOLIC BLOOD PRESSURE: 93 MMHG | BODY MASS INDEX: 37.52 KG/M2

## 2025-05-19 LAB
A PHAGOCYTOPH DNA BLD QL NAA+PROBE: NOT DETECTED
ANION GAP SERPL CALCULATED.3IONS-SCNC: 11 MMOL/L (ref 7–15)
B BURGDOR IGG+IGM SER QL: 0.15
BABESIA DNA BLD QL NAA+PROBE: NOT DETECTED
BASOPHILS # BLD AUTO: 0.1 10E3/UL (ref 0–0.2)
BASOPHILS # BLD AUTO: 0.1 10E3/UL (ref 0–0.2)
BASOPHILS NFR BLD AUTO: 0 %
BASOPHILS NFR BLD AUTO: 1 %
BUN SERPL-MCNC: 37.2 MG/DL (ref 8–23)
CALCIUM SERPL-MCNC: 9.9 MG/DL (ref 8.8–10.4)
CHLORIDE SERPL-SCNC: 108 MMOL/L (ref 98–107)
CREAT SERPL-MCNC: 1.72 MG/DL (ref 0.51–0.95)
CRP SERPL-MCNC: 34.92 MG/L
EGFRCR SERPLBLD CKD-EPI 2021: 33 ML/MIN/1.73M2
EHRLICHIA DNA SPEC QL NAA+PROBE: NOT DETECTED
EOSINOPHIL # BLD AUTO: 0.5 10E3/UL (ref 0–0.7)
EOSINOPHIL # BLD AUTO: 0.6 10E3/UL (ref 0–0.7)
EOSINOPHIL NFR BLD AUTO: 4 %
EOSINOPHIL NFR BLD AUTO: 5 %
ERYTHROCYTE [DISTWIDTH] IN BLOOD BY AUTOMATED COUNT: 15.7 % (ref 10–15)
ERYTHROCYTE [DISTWIDTH] IN BLOOD BY AUTOMATED COUNT: 15.8 % (ref 10–15)
ERYTHROCYTE [SEDIMENTATION RATE] IN BLOOD BY WESTERGREN METHOD: 11 MM/HR (ref 0–30)
FRAGMENTS BLD QL SMEAR: ABNORMAL
GLUCOSE BLDC GLUCOMTR-MCNC: 112 MG/DL (ref 70–99)
GLUCOSE BLDC GLUCOMTR-MCNC: 132 MG/DL (ref 70–99)
GLUCOSE BLDC GLUCOMTR-MCNC: 152 MG/DL (ref 70–99)
GLUCOSE BLDC GLUCOMTR-MCNC: 161 MG/DL (ref 70–99)
GLUCOSE BLDC GLUCOMTR-MCNC: 180 MG/DL (ref 70–99)
GLUCOSE SERPL-MCNC: 127 MG/DL (ref 70–99)
HCO3 SERPL-SCNC: 20 MMOL/L (ref 22–29)
HCT VFR BLD AUTO: 36.2 % (ref 35–47)
HCT VFR BLD AUTO: 36.4 % (ref 35–47)
HGB BLD-MCNC: 11.5 G/DL (ref 11.7–15.7)
HGB BLD-MCNC: 11.7 G/DL (ref 11.7–15.7)
IMM GRANULOCYTES # BLD: 0.1 10E3/UL
IMM GRANULOCYTES # BLD: 0.1 10E3/UL
IMM GRANULOCYTES NFR BLD: 1 %
IMM GRANULOCYTES NFR BLD: 1 %
LYMPHOCYTES # BLD AUTO: 3.3 10E3/UL (ref 0.8–5.3)
LYMPHOCYTES # BLD AUTO: 6.5 10E3/UL (ref 0.8–5.3)
LYMPHOCYTES NFR BLD AUTO: 28 %
LYMPHOCYTES NFR BLD AUTO: 45 %
MCH RBC QN AUTO: 30.8 PG (ref 26.5–33)
MCH RBC QN AUTO: 31.1 PG (ref 26.5–33)
MCHC RBC AUTO-ENTMCNC: 31.8 G/DL (ref 31.5–36.5)
MCHC RBC AUTO-ENTMCNC: 32.1 G/DL (ref 31.5–36.5)
MCV RBC AUTO: 97 FL (ref 78–100)
MCV RBC AUTO: 97 FL (ref 78–100)
MONOCYTES # BLD AUTO: 1 10E3/UL (ref 0–1.3)
MONOCYTES # BLD AUTO: 1.5 10E3/UL (ref 0–1.3)
MONOCYTES NFR BLD AUTO: 10 %
MONOCYTES NFR BLD AUTO: 8 %
NEUTROPHILS # BLD AUTO: 5.9 10E3/UL (ref 1.6–8.3)
NEUTROPHILS # BLD AUTO: 7 10E3/UL (ref 1.6–8.3)
NEUTROPHILS NFR BLD AUTO: 41 %
NEUTROPHILS NFR BLD AUTO: 58 %
NRBC # BLD AUTO: 0 10E3/UL
NRBC # BLD AUTO: 0 10E3/UL
NRBC BLD AUTO-RTO: 0 /100
NRBC BLD AUTO-RTO: 0 /100
PATH REV: ABNORMAL
PLAT MORPH BLD: ABNORMAL
PLATELET # BLD AUTO: 146 10E3/UL (ref 150–450)
PLATELET # BLD AUTO: 150 10E3/UL (ref 150–450)
POTASSIUM SERPL-SCNC: 4.4 MMOL/L (ref 3.4–5.3)
POTASSIUM SERPL-SCNC: 5.5 MMOL/L (ref 3.4–5.3)
PROCALCITONIN SERPL IA-MCNC: 0.13 NG/ML
RBC # BLD AUTO: 3.73 10E6/UL (ref 3.8–5.2)
RBC # BLD AUTO: 3.76 10E6/UL (ref 3.8–5.2)
RBC MORPH BLD: ABNORMAL
SMUDGE CELLS BLD QL SMEAR: PRESENT
SODIUM SERPL-SCNC: 139 MMOL/L (ref 135–145)
VARIANT LYMPHS BLD QL SMEAR: PRESENT
WBC # BLD AUTO: 12 10E3/UL (ref 4–11)
WBC # BLD AUTO: 14.5 10E3/UL (ref 4–11)

## 2025-05-19 PROCEDURE — 258N000001 HC RX 258: Performed by: HOSPITALIST

## 2025-05-19 PROCEDURE — 36415 COLL VENOUS BLD VENIPUNCTURE: CPT | Performed by: HOSPITALIST

## 2025-05-19 PROCEDURE — 250N000013 HC RX MED GY IP 250 OP 250 PS 637: Performed by: HOSPITALIST

## 2025-05-19 PROCEDURE — 94640 AIRWAY INHALATION TREATMENT: CPT

## 2025-05-19 PROCEDURE — 99239 HOSP IP/OBS DSCHRG MGMT >30: CPT | Performed by: HOSPITALIST

## 2025-05-19 PROCEDURE — 85652 RBC SED RATE AUTOMATED: CPT | Performed by: HOSPITALIST

## 2025-05-19 PROCEDURE — 258N000003 HC RX IP 258 OP 636: Performed by: HOSPITALIST

## 2025-05-19 PROCEDURE — 86140 C-REACTIVE PROTEIN: CPT | Performed by: HOSPITALIST

## 2025-05-19 PROCEDURE — 250N000013 HC RX MED GY IP 250 OP 250 PS 637: Performed by: INTERNAL MEDICINE

## 2025-05-19 PROCEDURE — 82374 ASSAY BLOOD CARBON DIOXIDE: CPT | Performed by: HOSPITALIST

## 2025-05-19 PROCEDURE — 250N000009 HC RX 250: Performed by: HOSPITALIST

## 2025-05-19 PROCEDURE — 84132 ASSAY OF SERUM POTASSIUM: CPT | Performed by: HOSPITALIST

## 2025-05-19 PROCEDURE — 250N000012 HC RX MED GY IP 250 OP 636 PS 637: Performed by: HOSPITALIST

## 2025-05-19 PROCEDURE — 84145 PROCALCITONIN (PCT): CPT | Performed by: HOSPITALIST

## 2025-05-19 PROCEDURE — 999N000157 HC STATISTIC RCP TIME EA 10 MIN

## 2025-05-19 PROCEDURE — 85018 HEMOGLOBIN: CPT | Performed by: HOSPITALIST

## 2025-05-19 PROCEDURE — 250N000013 HC RX MED GY IP 250 OP 250 PS 637: Performed by: PHYSICIAN ASSISTANT

## 2025-05-19 RX ORDER — DEXTROSE MONOHYDRATE 25 G/50ML
25-50 INJECTION, SOLUTION INTRAVENOUS
Status: DISCONTINUED | OUTPATIENT
Start: 2025-05-19 | End: 2025-05-19 | Stop reason: HOSPADM

## 2025-05-19 RX ORDER — NICOTINE POLACRILEX 4 MG
15-30 LOZENGE BUCCAL
Status: DISCONTINUED | OUTPATIENT
Start: 2025-05-19 | End: 2025-05-19 | Stop reason: HOSPADM

## 2025-05-19 RX ORDER — METOPROLOL SUCCINATE 25 MG/1
25 TABLET, EXTENDED RELEASE ORAL DAILY
Qty: 30 TABLET | Refills: 0 | Status: SHIPPED | OUTPATIENT
Start: 2025-05-20

## 2025-05-19 RX ORDER — LORATADINE 10 MG/1
10 TABLET ORAL DAILY
Qty: 5 TABLET | Refills: 0 | Status: SHIPPED | OUTPATIENT
Start: 2025-05-19

## 2025-05-19 RX ORDER — AMLODIPINE BESYLATE 5 MG/1
5 TABLET ORAL DAILY
Qty: 30 TABLET | Refills: 0 | Status: SHIPPED | OUTPATIENT
Start: 2025-05-20

## 2025-05-19 RX ORDER — DEXTROSE MONOHYDRATE 25 G/50ML
25 INJECTION, SOLUTION INTRAVENOUS ONCE
Status: COMPLETED | OUTPATIENT
Start: 2025-05-19 | End: 2025-05-19

## 2025-05-19 RX ORDER — LORATADINE 10 MG/1
10 TABLET ORAL DAILY
Status: DISCONTINUED | OUTPATIENT
Start: 2025-05-19 | End: 2025-05-19 | Stop reason: HOSPADM

## 2025-05-19 RX ORDER — FLUTICASONE PROPIONATE 50 MCG
1 SPRAY, SUSPENSION (ML) NASAL DAILY
Qty: 9.9 ML | Refills: 0 | Status: SHIPPED | OUTPATIENT
Start: 2025-05-19 | End: 2025-05-26

## 2025-05-19 RX ORDER — FLUTICASONE PROPIONATE 50 MCG
1 SPRAY, SUSPENSION (ML) NASAL DAILY
Status: DISCONTINUED | OUTPATIENT
Start: 2025-05-19 | End: 2025-05-19 | Stop reason: HOSPADM

## 2025-05-19 RX ORDER — GUAIFENESIN/DEXTROMETHORPHAN 100-10MG/5
10 SYRUP ORAL EVERY 4 HOURS PRN
Qty: 236 ML | Refills: 0 | Status: SHIPPED | OUTPATIENT
Start: 2025-05-19

## 2025-05-19 RX ORDER — ALBUTEROL SULFATE 5 MG/ML
10 SOLUTION RESPIRATORY (INHALATION) ONCE
Status: COMPLETED | OUTPATIENT
Start: 2025-05-19 | End: 2025-05-19

## 2025-05-19 RX ORDER — ACETAMINOPHEN 325 MG/1
650 TABLET ORAL EVERY 6 HOURS PRN
COMMUNITY
Start: 2025-05-19

## 2025-05-19 RX ADMIN — SODIUM CHLORIDE 6 UNITS: 0.9 INJECTION, SOLUTION INTRAVENOUS at 08:43

## 2025-05-19 RX ADMIN — LORATADINE 10 MG: 10 TABLET ORAL at 14:47

## 2025-05-19 RX ADMIN — ALBUTEROL SULFATE 10 MG: 2.5 SOLUTION RESPIRATORY (INHALATION) at 08:49

## 2025-05-19 RX ADMIN — DEXTROSE MONOHYDRATE 25 G: 25 INJECTION, SOLUTION INTRAVENOUS at 08:42

## 2025-05-19 RX ADMIN — AMLODIPINE BESYLATE 5 MG: 5 TABLET ORAL at 08:43

## 2025-05-19 RX ADMIN — METOPROLOL SUCCINATE 25 MG: 25 TABLET, EXTENDED RELEASE ORAL at 08:43

## 2025-05-19 RX ADMIN — FLUTICASONE PROPIONATE 1 SPRAY: 50 SPRAY, METERED NASAL at 14:47

## 2025-05-19 ASSESSMENT — ACTIVITIES OF DAILY LIVING (ADL)
ADLS_ACUITY_SCORE: 43
ADLS_ACUITY_SCORE: 42
ADLS_ACUITY_SCORE: 43
ADLS_ACUITY_SCORE: 42
ADLS_ACUITY_SCORE: 42
ADLS_ACUITY_SCORE: 43
ADLS_ACUITY_SCORE: 42
ADLS_ACUITY_SCORE: 40
ADLS_ACUITY_SCORE: 42
ADLS_ACUITY_SCORE: 40
ADLS_ACUITY_SCORE: 40
ADLS_ACUITY_SCORE: 42
ADLS_ACUITY_SCORE: 40

## 2025-05-19 NOTE — DISCHARGE SUMMARY
"[facility]  Hospitalist Discharge Summary      Date of Admission:  5/15/2025  Date of Discharge:  5/19/2025  Discharging Provider: Farrah Hinojosa MD  Discharge Service: Hospitalist Service    Discharge Diagnoses     Please refer to the hospital course below    Clinically Significant Risk Factors     # Obesity: Estimated body mass index is 38.06 kg/m  as calculated from the following:    Height as of this encounter: 1.638 m (5' 4.5\").    Weight as of this encounter: 102.2 kg (225 lb 3.2 oz).       Follow-ups Needed After Discharge   Follow-up Appointments       Hospital Follow-up with Existing Primary Care Provider (PCP)          Schedule Primary Care visit within: 14 Days   Recommended labs and Imaging (to be ordered by Primary Care Provider): BMP, CBC                Unresulted Labs Ordered in the Past 30 Days of this Admission       Date and Time Order Name Status Description    5/18/2025  6:58 PM Tick-Borne Disease Panel (Non-Lyme) by PCR In process         These results will be followed up by hospitalist/PCP    Discharge Disposition   Discharged to home  Condition at discharge: Stable    Hospital Course     Evelyn Roa is a markedly pleasant 64 year old woman with past medical history that is most notable for hypertension, gout, and Bipolar 1 Disorder, among others; who presents with cough, exertional dyspnea, and syncope, and is found to have high grade AV block and elevated Troponin, as well as MURRAY.    Complete heart block s/p dual chamber pacemaker placement 5/15  Elevated Troponin - suspected type II NSTEMI  Of note, Ms. Roa has never smoked. She has hypertension. She denies previous history of CAD, CHF, stroke, or diabetes.   *5/15/25 she presents for evaluation of cough, exertional dyspnea, and syncope. In the ED, she has accelerated hypertension, and severe bradycardia as low as 25.  Noted leukocytosis, normal lactate, MURRAY with metabolic acidosis.TSH is 7.67, FT4 normal at " 0.97. Pro BNP is elevated at 4993. Troponin T levels are 130 and then 247. EKG shows sinus bradycardia with rate 32, A-V dissociation, and idioventricular escape beats. Valproic acid level is normal at 55.9  *CXR without PTX, with small effusions mentioned  *trops - up to 373 (denies chest pain or pressure)    - Cardiology/EP consulted, s/p pacemaker placed 5/15 - signed off since  - Toprol xl and amlodipine initiated per cardiology.  Received 1 dose of Lasix this stay.  Creatinine was up to 2.1 and lisinopril discontinued.  Although troponin elevated, no anginal symptoms and plan is outpatient  Lexiscan stress test in 1 month for risk stratification and cardiac MRI in 6 weeks.   Outpatient cardiology follow up to be arranged following testing in 2 months.  Will be arranged by cardiology.     Suspect sinusitis/URI  Cough for 3-4 weeks.  Reports postnasal drip.  No fevers or chills. Previous XR without convincing infiltrate.   *WBC remains elevated up to ~15k.  Niccoli nontoxic-appearing.  -CT chest without pneumonia, procalcitonin negative   - Loratadine and Flonase nasal spray added, Robitussin as needed     MURRAY - slowly improving  Acute AG metabolic acidosis   *adm CO2 is 13, Cr 2.37. Cr was 1.4 in 6/2024. Suspect due to modest hypovolemia, at least in part; vs cardiorenal syndrome. IVF was given in the ED.   *Repeat BMP 05/15/25 showing resolution of AG, HCO 20  *creat slowly improving, likely in setting of prolonged heart block and possibly poor perfusion  - Creatinine was up to 2.3 in has gradually improved and now is stable at 1.7.    - Lisinopril discontinued.  Received 1 dose of Lasix, appears euvolemic although has some leg edema.  Defer diuresis since no dyspnea or hypoxia.     Hyperkalemia  -Has had episodes of hyperkalemia.  Now resolved.  Monitor as outpatient.  Lisinopril has been discontinued.     Accelerated hypertension  - PTA lisinopril has been discontinued given MURRAY/hyperkalemia.  Now on  Toprol-XL and amlodipine, blood pressure 130-140s mostly.     Bipolar 1 Disorder: In remission  - On Depakote, Zyprexa, and Seroquel - continued     Gout  - Resume home Allopurinol      GERD  - Resume Pepcid      Glaucoma  - Resume home eye drops        Consultations This Hospital Stay   CARE MANAGEMENT / SOCIAL WORK IP CONSULT  CARDIOLOGY IP CONSULT    Code Status   Full Code    Time Spent on this Encounter   I, Farrah Hinojosa MD, personally saw the patient today and spent greater than 30 minutes discharging this patient.       Farrah Hinojosa MD  [facility]ON[facility]  [address], [address]  [facility] [address]  Phone: [phone]  ______________________________________________________________________    Physical Exam   Vital Signs: Temp: 97.3  F (36.3  C) Temp src: Oral BP: (!) 137/93 Pulse: 97   Resp: 18 SpO2: 97 % O2 Device: None (Room air) Oxygen Delivery: 2 LPM  Weight: 225 lbs 3.2 oz    General: AAOx3, appears comfortable.  HEENT: PERRLA EOMI. Mucosa moist.   Lungs: Bilateral equal air entry. Clear to auscultation, normal work of breathing.   CVS: S1S2 regular, no tachycardia or murmur.   Chest: PPM site Lt upper chest without significant bruise, hematoma.  Abdomen: Soft, NT, ND. BS heard.  MSK: Bilateral 1+ pitting pedal edema  Neuro: AAOX3. CN 2-12 normal. Strength symmetrical.  Skin: No rash.          Primary Care Physician   Daniela Carter    Discharge Orders      MRI Cardiac w/contrast     Follow-Up with Cardiology RHONDA      Reason for your hospital stay    Complete heart block, s/p PPM     Activity    Your activity upon discharge: activity as tolerated     Diet    Follow this diet upon discharge: Current Diet:Orders Placed This Encounter      Regular Diet Adult     NM Lexiscan stress test (nuc card)     Cardiac Device Check - Remote (Standing ORD 5 count)     Cardiac Device Check - In Clinic     Hospital Follow-up with Existing Primary Care Provider (PCP)             Significant Results and Procedures   Most Recent 3 CBC's:  Recent Labs   Lab Test 05/19/25  0601 05/18/25  0812 05/17/25  0559   WBC 12.0* 15.2* 14.5*   HGB 11.5* 13.0 11.7   MCV 97 97 97    149* 146*     Most Recent 3 BMP's:  Recent Labs   Lab Test 05/19/25  1129 05/19/25  1057 05/19/25  1047 05/19/25  1000 05/19/25  0838 05/19/25  0601 05/18/25  1905 05/18/25  0609   NA  --   --   --   --   --  139 140 137   POTASSIUM  --   --  4.4  --   --  5.5* 4.9 5.4*   CHLORIDE  --   --   --   --   --  108* 106 110*   CO2  --   --   --   --   --  20* 21* 15*   BUN  --   --   --   --   --  37.2* 36.1* 35.6*   CR  --   --   --   --   --  1.72* 1.77* 1.71*   ANIONGAP  --   --   --   --   --  11 13 12   JAMES  --   --   --   --   --  9.9 9.9 9.3   * 152*  --  132*   < > 127* 140* 109*    < > = values in this interval not displayed.     Most Recent 2 LFT's:No lab results found.  Most Recent 3 Troponin's:No lab results found.  Most Recent 3 BNP's:  Recent Labs   Lab Test 05/18/25 0609 05/14/25 1944   NTBNP 6,413* 4,993*     7-Day Micro Results       Collected Updated Procedure Result Status      05/18/2025 1905 05/18/2025 1913 Tick-Borne Disease Panel (Non-Lyme) by PCR [[id number]]   Blood from Arm, Right    In process Component Value   No component results                  Most Recent TSH and T4:  Recent Labs   Lab Test 05/14/25 1944   TSH 7.67*   T4 0.97     Most Recent Hemoglobin A1c:No lab results found.,   Results for orders placed or performed during the hospital encounter of 05/15/25   X-ray Chest 2 vws*    Narrative    EXAM: XR CHEST 2 VIEWS  LOCATION: [facility]  DATE: 5/16/2025    INDICATION: Status post pacer ICD  COMPARISON: 12/10/2024      Impression    IMPRESSION: New cardiac pacemaker leads in the right atrium and right ventricle. No pneumothorax. Small bilateral pleural effusions are new. Mild pulmonary vascular congestion without overt pulmonary edema.                  CT Chest w/o Contrast    Narrative    EXAM: CT CHEST W/O CONTRAST  LOCATION: [facility]  DATE: 5/18/2025    INDICATION: Cough and exertional dyspnea.  COMPARISON: 5/16/2025 chest x-ray  TECHNIQUE: CT chest without IV contrast. Multiplanar reformats were obtained. Dose reduction techniques were used.  CONTRAST: None.    FINDINGS:   LUNGS AND PLEURA: Minimal atelectasis in the lingula. Lungs otherwise clear. Tiny bilateral pleural effusion.    MEDIASTINUM/AXILLAE: Mild generalized cardiac enlargement. Pacemaker. No adenopathy.    CORONARY ARTERY CALCIFICATION: None.    UPPER ABDOMEN: Probable faint calcified gallstone.    MUSCULOSKELETAL: Normal.      Impression    IMPRESSION:   1.  Nothing for pneumonia.    2.  Tiny bilateral pleural effusions with some minimal atelectasis in the lingula.    3.  Probable tiny calcified gallstone.     EP Device    Narrative    Table formatting from the original result was not included.  PROCEDURES PERFORMED:   1. Implantation of a dual-chamber pacemaker with the ventricular lead   fixed at the left bundle region  2. Conscious sedation.   3. Cardiac fluoroscopy  4. Removal of a temporary transvenous pacemaker    INDICATION: CHB    HISTORY OF PRESENT ILLNESS: This is a 64 year old year-old patient   presented with GEORGE and syncope due new onset of CHB complicated by   bradycardia induced torsade de pointes requiring a temporary transvenous   pacemaker. No reversible causes identified. Pt was referred for a ppm.    METHOD: Informed consent was obtained and Intravenous antibiotic was given   prior to the procedure. The patient was prepped and draped in the usual   manner.  The procedure was performed under conscious sedation. 1%   lidocaine was infiltrated into the left axillary vein area. This vein was   accessed using the modified Seldinger technique and ultrasound guidance   when applicable with a micropuncture needle.  An incision was then made   with a #15  blade. A peelable sheath was then advanced over the guidewire.    A long sheath and the lead were then advanced into the heart.  Mapping of   the left bundle region was performed.  The lead was fixed into mid RV   septum area under fluoroscopic guidance. Appropriate impedance sensing and   threshold were obtained.  The long sheath was then cut away and the outer   sheath peeled away.    A peelable sheath was then advanced into the RA. A lead was then advanced   into the heart and fixed into the RA. Appropriated sensing and threshold   were obtained. There was no diaphragmatic stimulation with high output   pacing. The sheath was peeled away.  The leads were then secured to the   pectoralis muscle fascia with O-Ethibond sutures.    A pocket was then fashioned and was irrigated with NS solution. The leads   were then attached to the device and placed in the pocket. The temporary   pacemaker wire was removed. The pocket was then closed with 2-0 and 4-0   Vicryl sutures. Steri-Strips and an OpSite dressing were then placed over   the incision.  The patient was then transferred back to the [facility] in   stable condition.       DEVICE AND LEAD INFORMATION:  Implant Name Type Inv. Item Serial No.  Lot No. LRB No. Used   Action   LEAD PACEMAKER SELECTSECURE 69CM MD  [id number] - [id number] Leads LEAD   PACEMAKER SELECTSECURE 69CM MD  [id number] [employer]   [id number]  1 Implanted   IMP LEAD PACING BIPOLAR CAPSUREFIX NOVUS 52CM 5076-52 - [id number] Leads   IMP LEAD PACING BIPOLAR CAPSUREFIX NOVUS 52CM 5076-52 [id number]   [employer] [id number]  1 Implanted   PACEMAKER BREANNA MRI XT  - [id number] Pacemaker PACEMAKER BREANNA MRI XT    [id number] [employer] [id number]  1 Implanted       DEVICE MEASUREMENT:   RA: Sensin.5 millivolts, Threshold: 1.75 volts at 0.4 milliseconds,   380 ohms.  RV: Sensing:  millivolts, Threshold: 1.0 volts at 0.4 milliseconds, 980   ohms.    COMPLICATIONS:  None.    Fluoroscopy (minutes): @flouro@    CONCLUSION:    1. Uneventful implantation of a dual-chamber pacemaker   2. Removal of a temporary transvenous pacemaker        Echocardiogram Complete     Value    LVEF  55-60%    Narrative    [id number]  [id number]  [id number]^ZHANG^SANDRO^CHON     [facility]  Echocardiography Laboratory  [address] [place] [address]     Name: YOLANDA RAMIREZ  MRN: [id number]  : 1960  Study Date: 05/15/2025 02:40 PM  Age: 64 yrs  Gender: Female  Patient Location: [facility]  Reason For Study: Graf-Rios Syndrome (Heart Block, Conduction Disorder), AV  blo  Ordering Physician: SANDRO HOLCOMB  Referring Physician: Daniela Carter  Performed By: Francisco J Hernandez     BSA: 2.1 m2  Height: 65 in  Weight: 229 lb  HR: 60  BP: 154/69 mmHg  ______________________________________________________________________________  Procedure  Echocardiogram with two-dimensional, color and spectral Doppler. Definity (NDC  #87661-223) given intravenously.  ______________________________________________________________________________  Interpretation Summary     Possible complete AV block.  Left ventricular systolic function is normal.  The visual ejection fraction is 55-60%.  The left ventricle is normal in size.  The right ventricle is normal in structure, function and size.  Doppler interrogation does not demonstrate signficant stenosis or  insufficiency invovling cardiac valves     No old studies for comparison  ______________________________________________________________________________  Left Ventricle  The left ventricle is normal in size. There is normal left ventricular wall  thickness. Left ventricular systolic function is normal. The visual ejection  fraction is 55-60%. Left ventricular diastolic function is indeterminate. No  regional wall motion abnormalities noted. There is no thrombus seen in the  left ventricle.     Right Ventricle  The right ventricle is  normal in structure, function and size. There is no  mass or thrombus in the right ventricle.     Atria  Normal left atrial size. Right atrial size is normal. There is no atrial shunt  seen. The left atrial appendage is not well visualized.     Mitral Valve  The mitral valve leaflets appear normal. There is no evidence of stenosis,  fluttering, or prolapse. There is no mitral regurgitation noted. There is no  mitral valve stenosis.     Tricuspid Valve  Normal tricuspid valve. The right ventricular systolic pressure is  approximated at 30.4 mmHg plus the right atrial pressure. Right ventricle  systolic pressure estimate normal. There is mild (1+) tricuspid regurgitation.  There is no tricuspid stenosis.     Aortic Valve  The aortic valve is trileaflet. No aortic regurgitation is present. No aortic  stenosis is present.     Pulmonic Valve  The pulmonic valve is not well visualized. There is no pulmonic valvular  regurgitation. There is no pulmonic valvular stenosis.     Vessels  The aortic root is normal size. Normal size ascending aorta. Inferior vena  cava not well visualized for estimation of right atrial pressure. The  pulmonary artery is normal size.     Pericardium  The pericardium appears normal. There is no pleural effusion.     Rhythm  Possible complete AV block.  ______________________________________________________________________________  MMode/2D Measurements & Calculations  IVSd: 1.0 cm  IVSs: 3.6 cm     LVIDd: 5.2 cm  LVPWd: 0.97 cm  LV mass(C)d: 193.8 grams  LV mass(C)dI: 92.5 grams/m2  Ao root diam: 3.2 cm  LA dimension: 3.9 cm  asc Aorta Diam: 3.2 cm  LA/Ao: 1.2  LVOT diam: 2.0 cm  LVOT area: 3.1 cm2  Ao root diam index Ht(cm/m): 1.9  Ao root diam index BSA (cm/m2): 1.5  Asc Ao diam index BSA (cm/m2): 1.5  Asc Ao diam index Ht(cm/m): 1.9  LA Volume (BP): 87.9 ml     LA Volume Index (BP): 41.9 ml/m2  RWT: 0.37  TAPSE: 3.1 cm     Doppler Measurements & Calculations  MV E max geronimo: 87.0 cm/sec  MV A max  leonidas: 116.5 cm/sec  MV E/A: 0.75  MV dec slope: 354.6 cm/sec2  MV dec time: 0.25 sec  Ao V2 max: 187.3 cm/sec  Ao max P.0 mmHg  Ao V2 mean: 124.6 cm/sec  Ao mean P.2 mmHg  Ao V2 VTI: 39.0 cm  MAGDALENA(I,D): 2.1 cm2  MAGDALENA(V,D): 2.1 cm2  LV V1 max P.4 mmHg  LV V1 max: 126.8 cm/sec  LV V1 VTI: 26.3 cm  SV(LVOT): 80.5 ml  SI(LVOT): 38.4 ml/m2  PA acc time: 0.16 sec  TR max leonidas: 275.5 cm/sec  TR max P.4 mmHg  AV Leonidas Ratio (DI): 0.68  MAGDALENA Index (cm2/m2): 0.98  RV S Leonidas: 12.1 cm/sec     ______________________________________________________________________________  Report approved by: Dr. Miguelito Barker on 05/15/2025 04:02 PM         Cardiac Catheterization    Narrative    Successful insertion of temporary pacemaker via right femoral vein      Cardiology Interventional Staff Attestation note  I was scrubbed in and personally supervised the procedure and agree with   the  procedural note and results as described by .     Sincerely  Philippe Barker MD       Discharge Medications   Current Discharge Medication List        START taking these medications    Details   acetaminophen (TYLENOL) 325 MG tablet Take 2 tablets (650 mg) by mouth every 6 hours as needed for mild pain or other (and adjunct with moderate or severe pain or per patient request).    Associated Diagnoses: Pain      amLODIPine (NORVASC) 5 MG tablet Take 1 tablet (5 mg) by mouth daily.  Qty: 30 tablet, Refills: 0    Comments: Future refills by PCP Dr. Daniela Carter with phone number [phone].  Associated Diagnoses: Benign essential hypertension      fluticasone (FLONASE) 50 MCG/ACT nasal spray Spray 1 spray into both nostrils daily for 7 days.  Qty: 9.9 mL, Refills: 0    Associated Diagnoses: Congestion of paranasal sinus      guaiFENesin-dextromethorphan (ROBITUSSIN DM) 100-10 MG/5ML syrup Take 10 mLs by mouth every 4 hours as needed for cough.  Qty: 236 mL, Refills: 0    Associated Diagnoses: Congestion of paranasal sinus      loratadine  "(CLARITIN) 10 MG tablet Take 1 tablet (10 mg) by mouth daily.  Qty: 5 tablet, Refills: 0    Associated Diagnoses: Congestion of paranasal sinus      metoprolol succinate ER (TOPROL XL) 25 MG 24 hr tablet Take 1 tablet (25 mg) by mouth daily.  Qty: 30 tablet, Refills: 0    Comments: Future refills by PCP Dr. Daniela Carter with phone number [phone].  Associated Diagnoses: Benign essential hypertension           CONTINUE these medications which have NOT CHANGED    Details   allopurinol (ZYLOPRIM) 300 MG tablet Take 1 tablet by mouth daily (with dinner).      diphenhydrAMINE (BENADRYL) 25 MG tablet Take 50 mg by mouth at bedtime.      !! divalproex sodium extended-release (DEPAKOTE ER) 500 MG 24 hr tablet Take 1,000 mg by mouth at bedtime. Take 2 tablets (1,000 mg) on even days and 3 tablets (1,500 mg) on odd days.      !! divalproex sodium extended-release (DEPAKOTE ER) 500 MG 24 hr tablet Take 1,500 mg by mouth at bedtime. Take 3 tablets (1,500 mg) on odd days and 2 tablets (1,000 mg) on even days.      famotidine (PEPCID) 40 MG tablet Take 40 mg by mouth daily as needed for heartburn.      latanoprost (XALATAN) 0.005 % ophthalmic solution Place 1 drop into both eyes at bedtime.      Multiple Vitamins-Minerals (HAIR SKIN & NAILS PO) Take 1 tablet by mouth daily (with dinner).      OLANZapine (ZYPREXA) 20 MG tablet Take 20 mg by mouth at bedtime.      QUEtiapine (SEROQUEL) 100 MG tablet Take 100-200 mg by mouth at bedtime.       !! - Potential duplicate medications found. Please discuss with provider.        STOP taking these medications       lisinopril (ZESTRIL) 20 MG tablet Comments:   Reason for Stopping:             Allergies   Allergies   Allergen Reactions    Codeine Other (See Comments)     \"knocks out\". Is able to tolerate if she ONLY takes it at night when she is ill     "

## 2025-05-19 NOTE — PLAN OF CARE
Care plan note:      Recent Vitals:  Temp: 97.7  F (36.5  C) Temp src: Oral BP: (!) 141/60 Pulse: 79   Resp: 19 SpO2: 94 % O2 Device: None (Room air)      Orientation/Neuro: Alert and Oriented x 4  Pain: The patient is not having any pain.   Tele: V paved w/underlying SR   IV medications: None   Mobility: up Independently   Skin: Pacer site on left chest, Dressing intact    Resp: RA, PRN Robitussin and Tessalon given for cough   GI: WDL.   : WDL     Diet: Tolerating diet:   Well   Orders Placed This Encounter      Regular Diet Adult      Safety/Concerns:  None  Aggression Color: Green    Plan:    Continue to monitor AM labs, possible discharge today. Toshia scan and cardiac MRI      Christine Sharma RN

## 2025-05-19 NOTE — PROGRESS NOTES
VSS, no c/o pain, ppm site CDI, no hematoma, radial pulse intact.  AVS, follow up appts, and care notes all given and discussed in depth with patient and daughter, both verbalized understanding of material and all questions and concerns addressed at this time.     yes

## 2025-05-27 ENCOUNTER — ANCILLARY PROCEDURE (OUTPATIENT)
Dept: CARDIOLOGY | Facility: CLINIC | Age: 65
End: 2025-05-27
Attending: INTERNAL MEDICINE
Payer: COMMERCIAL

## 2025-05-27 DIAGNOSIS — I44.2 COMPLETE HEART BLOCK (H): ICD-10-CM

## 2025-05-27 DIAGNOSIS — I44.2 THIRD DEGREE HEART BLOCK (H): ICD-10-CM

## 2025-05-27 DIAGNOSIS — I45.9 STOKES-ADAMS SYNCOPE: ICD-10-CM

## 2025-05-27 PROCEDURE — 93280 PM DEVICE PROGR EVAL DUAL: CPT | Performed by: INTERNAL MEDICINE

## 2025-05-28 LAB
MDC_IDC_LEAD_CONNECTION_STATUS: NORMAL
MDC_IDC_LEAD_CONNECTION_STATUS: NORMAL
MDC_IDC_LEAD_IMPLANT_DT: NORMAL
MDC_IDC_LEAD_IMPLANT_DT: NORMAL
MDC_IDC_LEAD_LOCATION: NORMAL
MDC_IDC_LEAD_LOCATION: NORMAL
MDC_IDC_LEAD_LOCATION_DETAIL_1: NORMAL
MDC_IDC_LEAD_LOCATION_DETAIL_1: NORMAL
MDC_IDC_LEAD_MFG: NORMAL
MDC_IDC_LEAD_MFG: NORMAL
MDC_IDC_LEAD_MODEL: NORMAL
MDC_IDC_LEAD_MODEL: NORMAL
MDC_IDC_LEAD_POLARITY_TYPE: NORMAL
MDC_IDC_LEAD_POLARITY_TYPE: NORMAL
MDC_IDC_LEAD_SERIAL: NORMAL
MDC_IDC_LEAD_SERIAL: NORMAL
MDC_IDC_MSMT_BATTERY_DTM: NORMAL
MDC_IDC_MSMT_BATTERY_REMAINING_LONGEVITY: 153 MO
MDC_IDC_MSMT_BATTERY_RRT_TRIGGER: 2.62
MDC_IDC_MSMT_BATTERY_STATUS: NORMAL
MDC_IDC_MSMT_BATTERY_VOLTAGE: 3.23 V
MDC_IDC_MSMT_LEADCHNL_RA_IMPEDANCE_VALUE: 342 OHM
MDC_IDC_MSMT_LEADCHNL_RA_IMPEDANCE_VALUE: 380 OHM
MDC_IDC_MSMT_LEADCHNL_RA_PACING_THRESHOLD_AMPLITUDE: 0.5 V
MDC_IDC_MSMT_LEADCHNL_RA_PACING_THRESHOLD_PULSEWIDTH: 0.4 MS
MDC_IDC_MSMT_LEADCHNL_RA_SENSING_INTR_AMPL: 2.12 MV
MDC_IDC_MSMT_LEADCHNL_RA_SENSING_INTR_AMPL: 2.38 MV
MDC_IDC_MSMT_LEADCHNL_RV_IMPEDANCE_VALUE: 418 OHM
MDC_IDC_MSMT_LEADCHNL_RV_IMPEDANCE_VALUE: 608 OHM
MDC_IDC_MSMT_LEADCHNL_RV_PACING_THRESHOLD_AMPLITUDE: 0.88 V
MDC_IDC_MSMT_LEADCHNL_RV_PACING_THRESHOLD_PULSEWIDTH: 0.4 MS
MDC_IDC_PG_IMPLANT_DTM: NORMAL
MDC_IDC_PG_MFG: NORMAL
MDC_IDC_PG_MODEL: NORMAL
MDC_IDC_PG_SERIAL: NORMAL
MDC_IDC_PG_TYPE: NORMAL
MDC_IDC_SESS_CLINIC_NAME: NORMAL
MDC_IDC_SESS_DTM: NORMAL
MDC_IDC_SESS_TYPE: NORMAL
MDC_IDC_SET_BRADY_AT_MODE_SWITCH_RATE: 171 {BEATS}/MIN
MDC_IDC_SET_BRADY_HYSTRATE: NORMAL
MDC_IDC_SET_BRADY_LOWRATE: 60 {BEATS}/MIN
MDC_IDC_SET_BRADY_MAX_SENSOR_RATE: 130 {BEATS}/MIN
MDC_IDC_SET_BRADY_MAX_TRACKING_RATE: 130 {BEATS}/MIN
MDC_IDC_SET_BRADY_MODE: NORMAL
MDC_IDC_SET_BRADY_PAV_DELAY_LOW: 180 MS
MDC_IDC_SET_BRADY_SAV_DELAY_LOW: 150 MS
MDC_IDC_SET_LEADCHNL_RA_PACING_AMPLITUDE: 1.5 V
MDC_IDC_SET_LEADCHNL_RA_PACING_ANODE_ELECTRODE_1: NORMAL
MDC_IDC_SET_LEADCHNL_RA_PACING_ANODE_LOCATION_1: NORMAL
MDC_IDC_SET_LEADCHNL_RA_PACING_CAPTURE_MODE: NORMAL
MDC_IDC_SET_LEADCHNL_RA_PACING_CATHODE_ELECTRODE_1: NORMAL
MDC_IDC_SET_LEADCHNL_RA_PACING_CATHODE_LOCATION_1: NORMAL
MDC_IDC_SET_LEADCHNL_RA_PACING_POLARITY: NORMAL
MDC_IDC_SET_LEADCHNL_RA_PACING_PULSEWIDTH: 0.4 MS
MDC_IDC_SET_LEADCHNL_RA_SENSING_ANODE_ELECTRODE_1: NORMAL
MDC_IDC_SET_LEADCHNL_RA_SENSING_ANODE_LOCATION_1: NORMAL
MDC_IDC_SET_LEADCHNL_RA_SENSING_CATHODE_ELECTRODE_1: NORMAL
MDC_IDC_SET_LEADCHNL_RA_SENSING_CATHODE_LOCATION_1: NORMAL
MDC_IDC_SET_LEADCHNL_RA_SENSING_POLARITY: NORMAL
MDC_IDC_SET_LEADCHNL_RA_SENSING_SENSITIVITY: 0.3 MV
MDC_IDC_SET_LEADCHNL_RV_PACING_AMPLITUDE: 2 V
MDC_IDC_SET_LEADCHNL_RV_PACING_ANODE_ELECTRODE_1: NORMAL
MDC_IDC_SET_LEADCHNL_RV_PACING_ANODE_LOCATION_1: NORMAL
MDC_IDC_SET_LEADCHNL_RV_PACING_CAPTURE_MODE: NORMAL
MDC_IDC_SET_LEADCHNL_RV_PACING_CATHODE_ELECTRODE_1: NORMAL
MDC_IDC_SET_LEADCHNL_RV_PACING_CATHODE_LOCATION_1: NORMAL
MDC_IDC_SET_LEADCHNL_RV_PACING_POLARITY: NORMAL
MDC_IDC_SET_LEADCHNL_RV_PACING_PULSEWIDTH: 0.4 MS
MDC_IDC_SET_LEADCHNL_RV_SENSING_ANODE_ELECTRODE_1: NORMAL
MDC_IDC_SET_LEADCHNL_RV_SENSING_ANODE_LOCATION_1: NORMAL
MDC_IDC_SET_LEADCHNL_RV_SENSING_CATHODE_ELECTRODE_1: NORMAL
MDC_IDC_SET_LEADCHNL_RV_SENSING_CATHODE_LOCATION_1: NORMAL
MDC_IDC_SET_LEADCHNL_RV_SENSING_POLARITY: NORMAL
MDC_IDC_SET_LEADCHNL_RV_SENSING_SENSITIVITY: 0.9 MV
MDC_IDC_SET_ZONE_DETECTION_INTERVAL: 350 MS
MDC_IDC_SET_ZONE_DETECTION_INTERVAL: 400 MS
MDC_IDC_SET_ZONE_STATUS: NORMAL
MDC_IDC_SET_ZONE_STATUS: NORMAL
MDC_IDC_SET_ZONE_TYPE: NORMAL
MDC_IDC_SET_ZONE_VENDOR_TYPE: NORMAL
MDC_IDC_STAT_AT_BURDEN_PERCENT: 0 %
MDC_IDC_STAT_AT_DTM_END: NORMAL
MDC_IDC_STAT_AT_DTM_START: NORMAL
MDC_IDC_STAT_BRADY_AP_VP_PERCENT: 8.04 %
MDC_IDC_STAT_BRADY_AP_VS_PERCENT: 0.01 %
MDC_IDC_STAT_BRADY_AS_VP_PERCENT: 91.86 %
MDC_IDC_STAT_BRADY_AS_VS_PERCENT: 0.09 %
MDC_IDC_STAT_BRADY_DTM_END: NORMAL
MDC_IDC_STAT_BRADY_DTM_START: NORMAL
MDC_IDC_STAT_BRADY_RA_PERCENT_PACED: 7.87 %
MDC_IDC_STAT_BRADY_RV_PERCENT_PACED: 99.9 %
MDC_IDC_STAT_EPISODE_RECENT_COUNT: 0
MDC_IDC_STAT_EPISODE_RECENT_COUNT_DTM_END: NORMAL
MDC_IDC_STAT_EPISODE_RECENT_COUNT_DTM_START: NORMAL
MDC_IDC_STAT_EPISODE_TOTAL_COUNT: 0
MDC_IDC_STAT_EPISODE_TOTAL_COUNT_DTM_END: NORMAL
MDC_IDC_STAT_EPISODE_TOTAL_COUNT_DTM_START: NORMAL
MDC_IDC_STAT_EPISODE_TYPE: NORMAL
MDC_IDC_STAT_TACHYTHERAPY_RECENT_DTM_END: NORMAL
MDC_IDC_STAT_TACHYTHERAPY_RECENT_DTM_START: NORMAL
MDC_IDC_STAT_TACHYTHERAPY_TOTAL_DTM_END: NORMAL
MDC_IDC_STAT_TACHYTHERAPY_TOTAL_DTM_START: NORMAL

## 2025-06-16 ENCOUNTER — HOSPITAL ENCOUNTER (OUTPATIENT)
Dept: CARDIOLOGY | Facility: CLINIC | Age: 65
Discharge: HOME | End: 2025-06-16
Payer: COMMERCIAL

## 2025-06-16 VITALS
DIASTOLIC BLOOD PRESSURE: 80 MMHG | OXYGEN SATURATION: 97 % | WEIGHT: 214.8 LBS | SYSTOLIC BLOOD PRESSURE: 138 MMHG | BODY MASS INDEX: 35.79 KG/M2 | HEIGHT: 65 IN

## 2025-06-16 VITALS — DIASTOLIC BLOOD PRESSURE: 78 MMHG | HEART RATE: 67 BPM | SYSTOLIC BLOOD PRESSURE: 138 MMHG

## 2025-06-16 DIAGNOSIS — I44.2 COMPLETE HEART BLOCK (H): ICD-10-CM

## 2025-06-16 LAB
CV STRESS MAX HR HE: 78
NUC STRESS EJECTION FRACTION: 51 %
RATE PRESSURE PRODUCT: NORMAL
STRESS ECHO BASELINE DIASTOLIC HE: 78
STRESS ECHO BASELINE HR: 68 BPM
STRESS ECHO BASELINE SYSTOLIC BP: 138
STRESS ECHO CALCULATED PERCENT HR: 50 %
STRESS ECHO LAST STRESS DIASTOLIC BP: 78
STRESS ECHO LAST STRESS SYSTOLIC BP: 140
STRESS ECHO TARGET HR: 157

## 2025-06-16 PROCEDURE — 343N000001 HC RX 343 MED OP 636

## 2025-06-16 PROCEDURE — A9502 TC99M TETROFOSMIN: HCPCS

## 2025-06-16 PROCEDURE — 250N000011 HC RX IP 250 OP 636: Mod: JZ | Performed by: INTERNAL MEDICINE

## 2025-06-16 PROCEDURE — 93017 CV STRESS TEST TRACING ONLY: CPT

## 2025-06-16 RX ORDER — CAFFEINE 200 MG
200 TABLET ORAL
Status: ACTIVE | OUTPATIENT
Start: 2025-06-16 | End: 2025-06-16

## 2025-06-16 RX ORDER — ALBUTEROL SULFATE 90 UG/1
2 INHALANT RESPIRATORY (INHALATION) EVERY 5 MIN PRN
Status: DISCONTINUED | OUTPATIENT
Start: 2025-06-16 | End: 2025-06-17 | Stop reason: HOSPADM

## 2025-06-16 RX ORDER — NITROGLYCERIN 0.4 MG/1
0.4 TABLET SUBLINGUAL EVERY 5 MIN PRN
Status: ACTIVE | OUTPATIENT
Start: 2025-06-16 | End: 2025-06-16

## 2025-06-16 RX ORDER — SODIUM CHLORIDE 9 MG/ML
INJECTION, SOLUTION INTRAVENOUS CONTINUOUS PRN
Status: ACTIVE | OUTPATIENT
Start: 2025-06-16 | End: 2025-06-16

## 2025-06-16 RX ORDER — AMINOPHYLLINE 25 MG/ML
50-100 INJECTION, SOLUTION INTRAVENOUS
Status: DISCONTINUED | OUTPATIENT
Start: 2025-06-16 | End: 2025-06-17 | Stop reason: HOSPADM

## 2025-06-16 RX ORDER — CAFFEINE CITRATE 20 MG/ML
60 SOLUTION INTRAVENOUS
Status: ACTIVE | OUTPATIENT
Start: 2025-06-16 | End: 2025-06-16

## 2025-06-16 RX ORDER — REGADENOSON 0.08 MG/ML
0.4 INJECTION, SOLUTION INTRAVENOUS ONCE
Status: COMPLETED | OUTPATIENT
Start: 2025-06-16 | End: 2025-06-16

## 2025-06-16 RX ORDER — LIDOCAINE 40 MG/G
CREAM TOPICAL
Status: DISCONTINUED | OUTPATIENT
Start: 2025-06-16 | End: 2025-06-17 | Stop reason: HOSPADM

## 2025-06-16 RX ADMIN — TETROFOSMIN 18.56 MILLICURIE: 1.38 INJECTION, POWDER, LYOPHILIZED, FOR SOLUTION INTRAVENOUS at 11:42

## 2025-06-16 RX ADMIN — TETROFOSMIN 6.27 MILLICURIE: 1.38 INJECTION, POWDER, LYOPHILIZED, FOR SOLUTION INTRAVENOUS at 09:36

## 2025-06-16 RX ADMIN — REGADENOSON 0.4 MG: 0.08 INJECTION, SOLUTION INTRAVENOUS at 11:38

## 2025-06-27 ENCOUNTER — ANCILLARY PROCEDURE (OUTPATIENT)
Dept: CARDIOLOGY | Facility: CLINIC | Age: 65
End: 2025-06-27
Attending: INTERNAL MEDICINE
Payer: COMMERCIAL

## 2025-06-27 ENCOUNTER — TELEPHONE (OUTPATIENT)
Dept: CARDIOLOGY | Facility: CLINIC | Age: 65
End: 2025-06-27

## 2025-06-27 DIAGNOSIS — I45.9 STOKES-ADAMS SYNCOPE: ICD-10-CM

## 2025-06-27 DIAGNOSIS — I44.2 THIRD DEGREE HEART BLOCK (H): ICD-10-CM

## 2025-06-27 DIAGNOSIS — I44.2 COMPLETE HEART BLOCK (H): ICD-10-CM

## 2025-06-27 LAB
MDC_IDC_LEAD_CONNECTION_STATUS: NORMAL
MDC_IDC_LEAD_CONNECTION_STATUS: NORMAL
MDC_IDC_LEAD_IMPLANT_DT: NORMAL
MDC_IDC_LEAD_IMPLANT_DT: NORMAL
MDC_IDC_LEAD_LOCATION: NORMAL
MDC_IDC_LEAD_LOCATION: NORMAL
MDC_IDC_LEAD_LOCATION_DETAIL_1: NORMAL
MDC_IDC_LEAD_LOCATION_DETAIL_1: NORMAL
MDC_IDC_LEAD_MFG: NORMAL
MDC_IDC_LEAD_MFG: NORMAL
MDC_IDC_LEAD_MODEL: NORMAL
MDC_IDC_LEAD_MODEL: NORMAL
MDC_IDC_LEAD_POLARITY_TYPE: NORMAL
MDC_IDC_LEAD_POLARITY_TYPE: NORMAL
MDC_IDC_LEAD_SERIAL: NORMAL
MDC_IDC_LEAD_SERIAL: NORMAL
MDC_IDC_MSMT_BATTERY_DTM: NORMAL
MDC_IDC_MSMT_BATTERY_REMAINING_LONGEVITY: 157 MO
MDC_IDC_MSMT_BATTERY_RRT_TRIGGER: 2.62
MDC_IDC_MSMT_BATTERY_STATUS: NORMAL
MDC_IDC_MSMT_BATTERY_VOLTAGE: 3.22 V
MDC_IDC_MSMT_LEADCHNL_RA_IMPEDANCE_VALUE: 399 OHM
MDC_IDC_MSMT_LEADCHNL_RA_IMPEDANCE_VALUE: 456 OHM
MDC_IDC_MSMT_LEADCHNL_RA_PACING_THRESHOLD_AMPLITUDE: 0.38 V
MDC_IDC_MSMT_LEADCHNL_RA_PACING_THRESHOLD_PULSEWIDTH: 0.4 MS
MDC_IDC_MSMT_LEADCHNL_RA_SENSING_INTR_AMPL: 2.88 MV
MDC_IDC_MSMT_LEADCHNL_RA_SENSING_INTR_AMPL: 3.12 MV
MDC_IDC_MSMT_LEADCHNL_RV_IMPEDANCE_VALUE: 456 OHM
MDC_IDC_MSMT_LEADCHNL_RV_IMPEDANCE_VALUE: 646 OHM
MDC_IDC_MSMT_LEADCHNL_RV_PACING_THRESHOLD_AMPLITUDE: 1 V
MDC_IDC_MSMT_LEADCHNL_RV_PACING_THRESHOLD_PULSEWIDTH: 0.4 MS
MDC_IDC_PG_IMPLANT_DTM: NORMAL
MDC_IDC_PG_MFG: NORMAL
MDC_IDC_PG_MODEL: NORMAL
MDC_IDC_PG_SERIAL: NORMAL
MDC_IDC_PG_TYPE: NORMAL
MDC_IDC_SESS_CLINIC_NAME: NORMAL
MDC_IDC_SESS_DTM: NORMAL
MDC_IDC_SESS_TYPE: NORMAL
MDC_IDC_SET_BRADY_AT_MODE_SWITCH_RATE: 171 {BEATS}/MIN
MDC_IDC_SET_BRADY_HYSTRATE: NORMAL
MDC_IDC_SET_BRADY_LOWRATE: 60 {BEATS}/MIN
MDC_IDC_SET_BRADY_MAX_SENSOR_RATE: 130 {BEATS}/MIN
MDC_IDC_SET_BRADY_MAX_TRACKING_RATE: 130 {BEATS}/MIN
MDC_IDC_SET_BRADY_MODE: NORMAL
MDC_IDC_SET_BRADY_PAV_DELAY_LOW: 180 MS
MDC_IDC_SET_BRADY_SAV_DELAY_LOW: 150 MS
MDC_IDC_SET_LEADCHNL_RA_PACING_AMPLITUDE: 1.5 V
MDC_IDC_SET_LEADCHNL_RA_PACING_ANODE_ELECTRODE_1: NORMAL
MDC_IDC_SET_LEADCHNL_RA_PACING_ANODE_LOCATION_1: NORMAL
MDC_IDC_SET_LEADCHNL_RA_PACING_CAPTURE_MODE: NORMAL
MDC_IDC_SET_LEADCHNL_RA_PACING_CATHODE_ELECTRODE_1: NORMAL
MDC_IDC_SET_LEADCHNL_RA_PACING_CATHODE_LOCATION_1: NORMAL
MDC_IDC_SET_LEADCHNL_RA_PACING_POLARITY: NORMAL
MDC_IDC_SET_LEADCHNL_RA_PACING_PULSEWIDTH: 0.4 MS
MDC_IDC_SET_LEADCHNL_RA_SENSING_ANODE_ELECTRODE_1: NORMAL
MDC_IDC_SET_LEADCHNL_RA_SENSING_ANODE_LOCATION_1: NORMAL
MDC_IDC_SET_LEADCHNL_RA_SENSING_CATHODE_ELECTRODE_1: NORMAL
MDC_IDC_SET_LEADCHNL_RA_SENSING_CATHODE_LOCATION_1: NORMAL
MDC_IDC_SET_LEADCHNL_RA_SENSING_POLARITY: NORMAL
MDC_IDC_SET_LEADCHNL_RA_SENSING_SENSITIVITY: 0.3 MV
MDC_IDC_SET_LEADCHNL_RV_PACING_AMPLITUDE: 2 V
MDC_IDC_SET_LEADCHNL_RV_PACING_ANODE_ELECTRODE_1: NORMAL
MDC_IDC_SET_LEADCHNL_RV_PACING_ANODE_LOCATION_1: NORMAL
MDC_IDC_SET_LEADCHNL_RV_PACING_CAPTURE_MODE: NORMAL
MDC_IDC_SET_LEADCHNL_RV_PACING_CATHODE_ELECTRODE_1: NORMAL
MDC_IDC_SET_LEADCHNL_RV_PACING_CATHODE_LOCATION_1: NORMAL
MDC_IDC_SET_LEADCHNL_RV_PACING_POLARITY: NORMAL
MDC_IDC_SET_LEADCHNL_RV_PACING_PULSEWIDTH: 0.4 MS
MDC_IDC_SET_LEADCHNL_RV_SENSING_ANODE_ELECTRODE_1: NORMAL
MDC_IDC_SET_LEADCHNL_RV_SENSING_ANODE_LOCATION_1: NORMAL
MDC_IDC_SET_LEADCHNL_RV_SENSING_CATHODE_ELECTRODE_1: NORMAL
MDC_IDC_SET_LEADCHNL_RV_SENSING_CATHODE_LOCATION_1: NORMAL
MDC_IDC_SET_LEADCHNL_RV_SENSING_POLARITY: NORMAL
MDC_IDC_SET_LEADCHNL_RV_SENSING_SENSITIVITY: 0.9 MV
MDC_IDC_SET_ZONE_DETECTION_INTERVAL: 350 MS
MDC_IDC_SET_ZONE_DETECTION_INTERVAL: 400 MS
MDC_IDC_SET_ZONE_STATUS: NORMAL
MDC_IDC_SET_ZONE_STATUS: NORMAL
MDC_IDC_SET_ZONE_TYPE: NORMAL
MDC_IDC_SET_ZONE_VENDOR_TYPE: NORMAL
MDC_IDC_STAT_AT_BURDEN_PERCENT: 0 %
MDC_IDC_STAT_AT_DTM_END: NORMAL
MDC_IDC_STAT_AT_DTM_START: NORMAL
MDC_IDC_STAT_BRADY_AP_VP_PERCENT: 5.36 %
MDC_IDC_STAT_BRADY_AP_VS_PERCENT: 0 %
MDC_IDC_STAT_BRADY_AS_VP_PERCENT: 94.62 %
MDC_IDC_STAT_BRADY_AS_VS_PERCENT: 0.01 %
MDC_IDC_STAT_BRADY_DTM_END: NORMAL
MDC_IDC_STAT_BRADY_DTM_START: NORMAL
MDC_IDC_STAT_BRADY_RA_PERCENT_PACED: 5.33 %
MDC_IDC_STAT_BRADY_RV_PERCENT_PACED: 99.98 %
MDC_IDC_STAT_EPISODE_RECENT_COUNT: 0
MDC_IDC_STAT_EPISODE_RECENT_COUNT_DTM_END: NORMAL
MDC_IDC_STAT_EPISODE_RECENT_COUNT_DTM_START: NORMAL
MDC_IDC_STAT_EPISODE_TOTAL_COUNT: 0
MDC_IDC_STAT_EPISODE_TOTAL_COUNT_DTM_END: NORMAL
MDC_IDC_STAT_EPISODE_TOTAL_COUNT_DTM_START: NORMAL
MDC_IDC_STAT_EPISODE_TYPE: NORMAL
MDC_IDC_STAT_TACHYTHERAPY_RECENT_DTM_END: NORMAL
MDC_IDC_STAT_TACHYTHERAPY_RECENT_DTM_START: NORMAL
MDC_IDC_STAT_TACHYTHERAPY_TOTAL_DTM_END: NORMAL
MDC_IDC_STAT_TACHYTHERAPY_TOTAL_DTM_START: NORMAL

## 2025-06-27 PROCEDURE — 93280 PM DEVICE PROGR EVAL DUAL: CPT | Performed by: INTERNAL MEDICINE

## 2025-06-27 NOTE — TELEPHONE ENCOUNTER
This Device meets the FDA criteria for MRI approval.    Cardiac MRI: PPM only for any device company: A  rep will need to be present for the MRI scan. Telephone encounter routed to P TOMAS/RU [facility] Heart Scheduling and P CV MRI Tech.    _________________________________________________________      Is the implanted device safe for MRI Exam? Yes  Is this device 3T compatible? Yes  Device Type: Pacemaker      Device Information: Medtronic, PPM [employer] (D)      Cardiology Orders for Device Programming     -- Yes -- The patient has a MRI conditional pulse generator and leads from the same     -- Yes -- The pulse generator and leads have been implanted for at least 6 weeks. (Does not apply to [employer] devices)    -- Yes-- The device is implanted in the right or left pectoral region    -- Correct, none known -- There are not any additional active cardiac devices, abandoned leads, lead extenders or adapters    -- Yes -- Lead impedances are within the normal range per  guidelines.    -- Yes -- If the patient is pacemaker dependent the thresholds are less than or equal to 4.0 V @ 1.0 ms    -- Yes -- RA and RV leads are programmed to bipolar pacing operation or pacing off. If no, MRI TO CONTACT THE DEVICE REP FOR PROGRAMMING. (Sensing can be bipolar or unipolar).     -- N/A -- LV lead programmed to bipolar pacing operation or pacing off. If no, CONTACT THE DEVICE REP TO DETERMINE IF REPROGRAMMING WILL NEED TO BE COMPLETED DAY OF PROCEDURE.       Date of last In-clinic Device check: 6/27/25  Results of last Device check:       Device programming during the scan guidelines   Pacing Mode (check one): Use the recommended pacing mode per Medtronic MRI Access Application  Pacing Rate: 60  bpm or 20bpm > intrinsic rhythm, not to exceed 120bpm    If remote reprogramming is applicable, please contact the Rep to assist    ALEX Morin

## 2025-08-14 ENCOUNTER — HOSPITAL ENCOUNTER (OUTPATIENT)
Dept: CARDIOLOGY | Facility: CLINIC | Age: 65
End: 2025-08-14
Payer: COMMERCIAL

## 2025-08-14 VITALS — DIASTOLIC BLOOD PRESSURE: 72 MMHG | HEART RATE: 89 BPM | SYSTOLIC BLOOD PRESSURE: 142 MMHG

## 2025-08-14 DIAGNOSIS — I44.2 COMPLETE HEART BLOCK (H): ICD-10-CM

## 2025-08-14 PROCEDURE — A9585 GADOBUTROL INJECTION: HCPCS

## 2025-08-14 PROCEDURE — 255N000002 HC RX 255 OP 636

## 2025-08-14 PROCEDURE — 75561 CARDIAC MRI FOR MORPH W/DYE: CPT

## 2025-08-14 RX ORDER — SODIUM CHLORIDE 9 MG/ML
INJECTION, SOLUTION INTRAVENOUS CONTINUOUS PRN
Status: ACTIVE | OUTPATIENT
Start: 2025-08-14 | End: 2025-08-14

## 2025-08-14 RX ORDER — GADOBUTROL 604.72 MG/ML
13 INJECTION INTRAVENOUS ONCE
Status: COMPLETED | OUTPATIENT
Start: 2025-08-14 | End: 2025-08-14

## 2025-08-14 RX ORDER — DIAZEPAM 5 MG/1
5 TABLET ORAL EVERY 30 MIN PRN
Status: ACTIVE | OUTPATIENT
Start: 2025-08-14

## 2025-08-14 RX ORDER — LORAZEPAM 0.5 MG/1
0.5 TABLET ORAL EVERY 30 MIN PRN
Status: ACTIVE | OUTPATIENT
Start: 2025-08-14

## 2025-08-14 RX ORDER — LIDOCAINE 40 MG/G
CREAM TOPICAL
OUTPATIENT
Start: 2025-08-14

## 2025-08-14 RX ORDER — NITROGLYCERIN 0.4 MG/1
0.4 TABLET SUBLINGUAL EVERY 5 MIN PRN
Status: ACTIVE | OUTPATIENT
Start: 2025-08-14 | End: 2025-08-14

## 2025-08-14 RX ADMIN — GADOBUTROL 13 ML: 604.72 INJECTION INTRAVENOUS at 11:49

## 2025-08-28 ENCOUNTER — OFFICE VISIT (OUTPATIENT)
Dept: CARDIOLOGY | Facility: CLINIC | Age: 65
End: 2025-08-28
Payer: COMMERCIAL

## 2025-08-28 VITALS
OXYGEN SATURATION: 98 % | HEIGHT: 65 IN | WEIGHT: 223 LBS | DIASTOLIC BLOOD PRESSURE: 75 MMHG | BODY MASS INDEX: 37.15 KG/M2 | SYSTOLIC BLOOD PRESSURE: 131 MMHG | HEART RATE: 60 BPM

## 2025-08-28 DIAGNOSIS — I44.2 COMPLETE HEART BLOCK (H): ICD-10-CM

## 2025-08-28 RX ORDER — MULTIVIT WITH MINERALS/LUTEIN
250 TABLET ORAL DAILY
COMMUNITY

## (undated) DEVICE — RAD INTRODUCER KIT MICRO 5FRX10CM .018 NITINOL G/W

## (undated) DEVICE — DEFIB PRO-PADZ LVP LQD GEL ADULT 8900-2105-01

## (undated) DEVICE — SLITTER ADJSTBL 6232ADJ

## (undated) DEVICE — INTRODUCER SHEATH FAST-CATH 6FRX12CM 406103

## (undated) DEVICE — NDL PERC ENTRY THINWALL 18GA 9.0" G00273

## (undated) DEVICE — CABLE ADAPTER PACING 6FT REMINGTON ADAP 2000

## (undated) DEVICE — CATH GD 5.4FR ID / 7FR OD X 43

## (undated) DEVICE — TOTE ANGIO CORP PC15AT SAN32CC83O

## (undated) DEVICE — CATH EP PACEL 5FRX110CM 1MM RIGHT HEART CURVE 401763

## (undated) DEVICE — GUIDEWIRE VASC 0.035INX150CM INQWIRE J TIP IQ35F150J3F/A

## (undated) DEVICE — CABLE PACING ALLIGATOR CLIP 12FT 5833SL

## (undated) DEVICE — INTRODUCER SAFESHEATH II LONG 7FR 23CM SSL7

## (undated) DEVICE — PACK PCMKR PERM SRG PROC LF SAN32PC573

## (undated) DEVICE — SHEATH PRELUDE SNAP 7FRX13CM PLS-1007

## (undated) RX ORDER — FENTANYL CITRATE 50 UG/ML
INJECTION, SOLUTION INTRAMUSCULAR; INTRAVENOUS
Status: DISPENSED
Start: 2025-05-15

## (undated) RX ORDER — HEPARIN SODIUM 1000 [USP'U]/ML
INJECTION, SOLUTION INTRAVENOUS; SUBCUTANEOUS
Status: DISPENSED
Start: 2025-05-15

## (undated) RX ORDER — REGADENOSON 0.08 MG/ML
INJECTION, SOLUTION INTRAVENOUS
Status: DISPENSED
Start: 2025-06-16

## (undated) RX ORDER — BUPIVACAINE HYDROCHLORIDE 2.5 MG/ML
INJECTION, SOLUTION EPIDURAL; INFILTRATION; INTRACAUDAL; PERINEURAL
Status: DISPENSED
Start: 2025-05-15

## (undated) RX ORDER — HEPARIN SODIUM 200 [USP'U]/100ML
INJECTION, SOLUTION INTRAVENOUS
Status: DISPENSED
Start: 2025-05-15

## (undated) RX ORDER — CEFAZOLIN SODIUM 2 G/50ML
SOLUTION INTRAVENOUS
Status: DISPENSED
Start: 2025-05-15

## (undated) RX ORDER — LIDOCAINE HYDROCHLORIDE 10 MG/ML
INJECTION, SOLUTION EPIDURAL; INFILTRATION; INTRACAUDAL; PERINEURAL
Status: DISPENSED
Start: 2025-05-15